# Patient Record
Sex: MALE | Race: WHITE | HISPANIC OR LATINO | Employment: FULL TIME | URBAN - METROPOLITAN AREA
[De-identification: names, ages, dates, MRNs, and addresses within clinical notes are randomized per-mention and may not be internally consistent; named-entity substitution may affect disease eponyms.]

---

## 2020-05-22 ENCOUNTER — HOSPITAL ENCOUNTER (INPATIENT)
Facility: MEDICAL CENTER | Age: 45
LOS: 1 days | DRG: 247 | End: 2020-05-24
Attending: EMERGENCY MEDICINE | Admitting: HOSPITALIST
Payer: COMMERCIAL

## 2020-05-22 ENCOUNTER — APPOINTMENT (OUTPATIENT)
Dept: RADIOLOGY | Facility: MEDICAL CENTER | Age: 45
DRG: 247 | End: 2020-05-22
Attending: EMERGENCY MEDICINE
Payer: COMMERCIAL

## 2020-05-22 DIAGNOSIS — I21.4 NSTEMI (NON-ST ELEVATED MYOCARDIAL INFARCTION) (HCC): ICD-10-CM

## 2020-05-22 PROBLEM — R07.9 PAIN IN THE CHEST: Status: ACTIVE | Noted: 2020-05-22

## 2020-05-22 PROBLEM — I16.0 HYPERTENSIVE URGENCY: Status: ACTIVE | Noted: 2020-05-22

## 2020-05-22 PROBLEM — I10 MALIGNANT HYPERTENSION: Status: ACTIVE | Noted: 2020-05-22

## 2020-05-22 LAB
ALBUMIN SERPL BCP-MCNC: 4.8 G/DL (ref 3.2–4.9)
ALBUMIN/GLOB SERPL: 1.5 G/DL
ALP SERPL-CCNC: 108 U/L (ref 30–99)
ALT SERPL-CCNC: 35 U/L (ref 2–50)
ANION GAP SERPL CALC-SCNC: 14 MMOL/L (ref 7–16)
APTT PPP: 25.1 SEC (ref 24.7–36)
AST SERPL-CCNC: 23 U/L (ref 12–45)
BASOPHILS # BLD AUTO: 0.4 % (ref 0–1.8)
BASOPHILS # BLD: 0.04 K/UL (ref 0–0.12)
BILIRUB SERPL-MCNC: 0.5 MG/DL (ref 0.1–1.5)
BUN SERPL-MCNC: 23 MG/DL (ref 8–22)
CALCIUM SERPL-MCNC: 10.1 MG/DL (ref 8.5–10.5)
CHLORIDE SERPL-SCNC: 104 MMOL/L (ref 96–112)
CO2 SERPL-SCNC: 23 MMOL/L (ref 20–33)
CREAT SERPL-MCNC: 1.24 MG/DL (ref 0.5–1.4)
EKG IMPRESSION: NORMAL
EKG IMPRESSION: NORMAL
EOSINOPHIL # BLD AUTO: 0.23 K/UL (ref 0–0.51)
EOSINOPHIL NFR BLD: 2.1 % (ref 0–6.9)
ERYTHROCYTE [DISTWIDTH] IN BLOOD BY AUTOMATED COUNT: 41.3 FL (ref 35.9–50)
GLOBULIN SER CALC-MCNC: 3.3 G/DL (ref 1.9–3.5)
GLUCOSE SERPL-MCNC: 101 MG/DL (ref 65–99)
HCT VFR BLD AUTO: 46.1 % (ref 42–52)
HGB BLD-MCNC: 15.8 G/DL (ref 14–18)
IMM GRANULOCYTES # BLD AUTO: 0.03 K/UL (ref 0–0.11)
IMM GRANULOCYTES NFR BLD AUTO: 0.3 % (ref 0–0.9)
INR PPP: 0.91 (ref 0.87–1.13)
LIPASE SERPL-CCNC: 24 U/L (ref 11–82)
LYMPHOCYTES # BLD AUTO: 3.49 K/UL (ref 1–4.8)
LYMPHOCYTES NFR BLD: 31.8 % (ref 22–41)
MCH RBC QN AUTO: 31.1 PG (ref 27–33)
MCHC RBC AUTO-ENTMCNC: 34.3 G/DL (ref 33.7–35.3)
MCV RBC AUTO: 90.7 FL (ref 81.4–97.8)
MONOCYTES # BLD AUTO: 0.96 K/UL (ref 0–0.85)
MONOCYTES NFR BLD AUTO: 8.8 % (ref 0–13.4)
NEUTROPHILS # BLD AUTO: 6.22 K/UL (ref 1.82–7.42)
NEUTROPHILS NFR BLD: 56.6 % (ref 44–72)
NRBC # BLD AUTO: 0 K/UL
NRBC BLD-RTO: 0 /100 WBC
NT-PROBNP SERPL IA-MCNC: 471 PG/ML (ref 0–125)
PLATELET # BLD AUTO: 284 K/UL (ref 164–446)
PMV BLD AUTO: 9.3 FL (ref 9–12.9)
POTASSIUM SERPL-SCNC: 4 MMOL/L (ref 3.6–5.5)
PROT SERPL-MCNC: 8.1 G/DL (ref 6–8.2)
PROTHROMBIN TIME: 12.5 SEC (ref 12–14.6)
RBC # BLD AUTO: 5.08 M/UL (ref 4.7–6.1)
SODIUM SERPL-SCNC: 141 MMOL/L (ref 135–145)
TROPONIN T SERPL-MCNC: 47 NG/L (ref 6–19)
WBC # BLD AUTO: 11 K/UL (ref 4.8–10.8)

## 2020-05-22 PROCEDURE — 36415 COLL VENOUS BLD VENIPUNCTURE: CPT

## 2020-05-22 PROCEDURE — 85610 PROTHROMBIN TIME: CPT

## 2020-05-22 PROCEDURE — G0378 HOSPITAL OBSERVATION PER HR: HCPCS

## 2020-05-22 PROCEDURE — 96365 THER/PROPH/DIAG IV INF INIT: CPT

## 2020-05-22 PROCEDURE — 99220 PR INITIAL OBSERVATION CARE,LEVL III: CPT | Performed by: HOSPITALIST

## 2020-05-22 PROCEDURE — 84484 ASSAY OF TROPONIN QUANT: CPT

## 2020-05-22 PROCEDURE — 71045 X-RAY EXAM CHEST 1 VIEW: CPT

## 2020-05-22 PROCEDURE — 85025 COMPLETE CBC W/AUTO DIFF WBC: CPT

## 2020-05-22 PROCEDURE — 93005 ELECTROCARDIOGRAM TRACING: CPT | Performed by: EMERGENCY MEDICINE

## 2020-05-22 PROCEDURE — 96366 THER/PROPH/DIAG IV INF ADDON: CPT

## 2020-05-22 PROCEDURE — 80053 COMPREHEN METABOLIC PANEL: CPT

## 2020-05-22 PROCEDURE — 99285 EMERGENCY DEPT VISIT HI MDM: CPT

## 2020-05-22 PROCEDURE — 83690 ASSAY OF LIPASE: CPT

## 2020-05-22 PROCEDURE — 700111 HCHG RX REV CODE 636 W/ 250 OVERRIDE (IP): Performed by: EMERGENCY MEDICINE

## 2020-05-22 PROCEDURE — 96375 TX/PRO/DX INJ NEW DRUG ADDON: CPT

## 2020-05-22 PROCEDURE — A9270 NON-COVERED ITEM OR SERVICE: HCPCS | Performed by: EMERGENCY MEDICINE

## 2020-05-22 PROCEDURE — 83880 ASSAY OF NATRIURETIC PEPTIDE: CPT

## 2020-05-22 PROCEDURE — 700111 HCHG RX REV CODE 636 W/ 250 OVERRIDE (IP): Performed by: HOSPITALIST

## 2020-05-22 PROCEDURE — 700102 HCHG RX REV CODE 250 W/ 637 OVERRIDE(OP): Performed by: EMERGENCY MEDICINE

## 2020-05-22 PROCEDURE — 93005 ELECTROCARDIOGRAM TRACING: CPT | Performed by: HOSPITALIST

## 2020-05-22 PROCEDURE — 85730 THROMBOPLASTIN TIME PARTIAL: CPT

## 2020-05-22 RX ORDER — NITROGLYCERIN 0.4 MG/1
0.4 TABLET SUBLINGUAL
Status: DISCONTINUED | OUTPATIENT
Start: 2020-05-22 | End: 2020-05-22

## 2020-05-22 RX ORDER — HEPARIN SODIUM 5000 [USP'U]/100ML
INJECTION, SOLUTION INTRAVENOUS CONTINUOUS
Status: DISCONTINUED | OUTPATIENT
Start: 2020-05-22 | End: 2020-05-23

## 2020-05-22 RX ORDER — HEPARIN SODIUM 1000 [USP'U]/ML
6000 INJECTION, SOLUTION INTRAVENOUS; SUBCUTANEOUS ONCE
Status: COMPLETED | OUTPATIENT
Start: 2020-05-22 | End: 2020-05-22

## 2020-05-22 RX ORDER — ONDANSETRON 2 MG/ML
4 INJECTION INTRAMUSCULAR; INTRAVENOUS ONCE
Status: COMPLETED | OUTPATIENT
Start: 2020-05-22 | End: 2020-05-22

## 2020-05-22 RX ORDER — ASPIRIN 325 MG
325 TABLET ORAL ONCE
Status: COMPLETED | OUTPATIENT
Start: 2020-05-22 | End: 2020-05-22

## 2020-05-22 RX ORDER — LISINOPRIL 10 MG/1
TABLET ORAL
COMMUNITY
End: 2020-06-03

## 2020-05-22 RX ORDER — POLYETHYLENE GLYCOL 3350 17 G/17G
1 POWDER, FOR SOLUTION ORAL
Status: DISCONTINUED | OUTPATIENT
Start: 2020-05-22 | End: 2020-05-24 | Stop reason: HOSPADM

## 2020-05-22 RX ORDER — LISINOPRIL 10 MG/1
10 TABLET ORAL
Status: DISCONTINUED | OUTPATIENT
Start: 2020-05-23 | End: 2020-05-24 | Stop reason: HOSPADM

## 2020-05-22 RX ORDER — HEPARIN SODIUM 1000 [USP'U]/ML
3200 INJECTION, SOLUTION INTRAVENOUS; SUBCUTANEOUS PRN
Status: DISCONTINUED | OUTPATIENT
Start: 2020-05-22 | End: 2020-05-23

## 2020-05-22 RX ORDER — AMLODIPINE BESYLATE 10 MG/1
10 TABLET ORAL DAILY
COMMUNITY
End: 2020-06-03

## 2020-05-22 RX ORDER — ATORVASTATIN CALCIUM 20 MG/1
40 TABLET, FILM COATED ORAL DAILY
COMMUNITY
End: 2020-06-03

## 2020-05-22 RX ORDER — HYDRALAZINE HYDROCHLORIDE 20 MG/ML
10 INJECTION INTRAMUSCULAR; INTRAVENOUS EVERY 6 HOURS PRN
Status: DISCONTINUED | OUTPATIENT
Start: 2020-05-22 | End: 2020-05-24 | Stop reason: HOSPADM

## 2020-05-22 RX ORDER — MORPHINE SULFATE 4 MG/ML
2-4 INJECTION, SOLUTION INTRAMUSCULAR; INTRAVENOUS
Status: DISCONTINUED | OUTPATIENT
Start: 2020-05-22 | End: 2020-05-24 | Stop reason: HOSPADM

## 2020-05-22 RX ORDER — ATORVASTATIN CALCIUM 40 MG/1
40 TABLET, FILM COATED ORAL EVERY EVENING
Status: DISCONTINUED | OUTPATIENT
Start: 2020-05-22 | End: 2020-05-23

## 2020-05-22 RX ORDER — CARVEDILOL 12.5 MG/1
12.5 TABLET ORAL 2 TIMES DAILY WITH MEALS
Status: DISCONTINUED | OUTPATIENT
Start: 2020-05-23 | End: 2020-05-24

## 2020-05-22 RX ORDER — NITROGLYCERIN 0.4 MG/1
0.4 TABLET SUBLINGUAL
Status: DISCONTINUED | OUTPATIENT
Start: 2020-05-22 | End: 2020-05-24 | Stop reason: HOSPADM

## 2020-05-22 RX ORDER — AMLODIPINE BESYLATE 5 MG/1
10 TABLET ORAL
Status: DISCONTINUED | OUTPATIENT
Start: 2020-05-23 | End: 2020-05-23

## 2020-05-22 RX ORDER — BISACODYL 10 MG
10 SUPPOSITORY, RECTAL RECTAL
Status: DISCONTINUED | OUTPATIENT
Start: 2020-05-22 | End: 2020-05-24 | Stop reason: HOSPADM

## 2020-05-22 RX ORDER — CARVEDILOL 12.5 MG/1
12.5 TABLET ORAL 2 TIMES DAILY WITH MEALS
COMMUNITY
End: 2020-06-03

## 2020-05-22 RX ORDER — ASPIRIN 81 MG/1
81 TABLET, CHEWABLE ORAL DAILY
COMMUNITY
End: 2024-01-17

## 2020-05-22 RX ORDER — AMOXICILLIN 250 MG
2 CAPSULE ORAL 2 TIMES DAILY
Status: DISCONTINUED | OUTPATIENT
Start: 2020-05-22 | End: 2020-05-24 | Stop reason: HOSPADM

## 2020-05-22 RX ADMIN — HYDRALAZINE HYDROCHLORIDE 10 MG: 20 INJECTION INTRAMUSCULAR; INTRAVENOUS at 22:10

## 2020-05-22 RX ADMIN — ASPIRIN 325 MG: 325 TABLET, FILM COATED ORAL at 20:02

## 2020-05-22 RX ADMIN — FENTANYL CITRATE 50 MCG: 50 INJECTION INTRAMUSCULAR; INTRAVENOUS at 20:02

## 2020-05-22 RX ADMIN — HEPARIN SODIUM 1200 UNITS/HR: 5000 INJECTION, SOLUTION INTRAVENOUS at 20:24

## 2020-05-22 RX ADMIN — HEPARIN SODIUM 6000 UNITS: 1000 INJECTION, SOLUTION INTRAVENOUS; SUBCUTANEOUS at 20:24

## 2020-05-22 RX ADMIN — ONDANSETRON 4 MG: 2 INJECTION INTRAMUSCULAR; INTRAVENOUS at 20:02

## 2020-05-22 SDOH — HEALTH STABILITY: MENTAL HEALTH: HOW OFTEN DO YOU HAVE 6 OR MORE DRINKS ON ONE OCCASION?: LESS THAN MONTHLY

## 2020-05-22 SDOH — HEALTH STABILITY: MENTAL HEALTH: HOW MANY STANDARD DRINKS CONTAINING ALCOHOL DO YOU HAVE ON A TYPICAL DAY?: 5 OR 6

## 2020-05-22 SDOH — ECONOMIC STABILITY: TRANSPORTATION INSECURITY
IN THE PAST 12 MONTHS, HAS LACK OF TRANSPORTATION KEPT YOU FROM MEETINGS, WORK, OR FROM GETTING THINGS NEEDED FOR DAILY LIVING?: PATIENT DECLINED

## 2020-05-22 SDOH — ECONOMIC STABILITY: FOOD INSECURITY: WITHIN THE PAST 12 MONTHS, THE FOOD YOU BOUGHT JUST DIDN'T LAST AND YOU DIDN'T HAVE MONEY TO GET MORE.: PATIENT DECLINED

## 2020-05-22 SDOH — HEALTH STABILITY: MENTAL HEALTH: HOW OFTEN DO YOU HAVE A DRINK CONTAINING ALCOHOL?: 2-4 TIMES A MONTH

## 2020-05-22 SDOH — ECONOMIC STABILITY: TRANSPORTATION INSECURITY
IN THE PAST 12 MONTHS, HAS THE LACK OF TRANSPORTATION KEPT YOU FROM MEDICAL APPOINTMENTS OR FROM GETTING MEDICATIONS?: PATIENT DECLINED

## 2020-05-22 SDOH — ECONOMIC STABILITY: FOOD INSECURITY: WITHIN THE PAST 12 MONTHS, YOU WORRIED THAT YOUR FOOD WOULD RUN OUT BEFORE YOU GOT MONEY TO BUY MORE.: PATIENT DECLINED

## 2020-05-22 ASSESSMENT — ENCOUNTER SYMPTOMS
TINGLING: 0
SORE THROAT: 0
WHEEZING: 0
HEADACHES: 0
VOMITING: 0
DIARRHEA: 0
ABDOMINAL PAIN: 0
DIZZINESS: 0
CHILLS: 0
PHOTOPHOBIA: 0
FOCAL WEAKNESS: 0
DEPRESSION: 0
PALPITATIONS: 0
SHORTNESS OF BREATH: 1
MYALGIAS: 0
NAUSEA: 0
COUGH: 0
FEVER: 0

## 2020-05-22 ASSESSMENT — FIBROSIS 4 INDEX
FIB4 SCORE: 0.63

## 2020-05-22 ASSESSMENT — COGNITIVE AND FUNCTIONAL STATUS - GENERAL
DAILY ACTIVITIY SCORE: 24
MOBILITY SCORE: 24
SUGGESTED CMS G CODE MODIFIER MOBILITY: CH
SUGGESTED CMS G CODE MODIFIER DAILY ACTIVITY: CH

## 2020-05-22 ASSESSMENT — COPD QUESTIONNAIRES
DURING THE PAST 4 WEEKS HOW MUCH DID YOU FEEL SHORT OF BREATH: NONE/LITTLE OF THE TIME
COPD SCREENING SCORE: 2
IN THE PAST 12 MONTHS DO YOU DO LESS THAN YOU USED TO BECAUSE OF YOUR BREATHING PROBLEMS: DISAGREE/UNSURE
HAVE YOU SMOKED AT LEAST 100 CIGARETTES IN YOUR ENTIRE LIFE: YES
DO YOU EVER COUGH UP ANY MUCUS OR PHLEGM?: NO/ONLY WITH OCCASIONAL COLDS OR INFECTIONS

## 2020-05-22 ASSESSMENT — LIFESTYLE VARIABLES
HOW MANY TIMES IN THE PAST YEAR HAVE YOU HAD 5 OR MORE DRINKS IN A DAY: 0
EVER FELT BAD OR GUILTY ABOUT YOUR DRINKING: NO
AVERAGE NUMBER OF DAYS PER WEEK YOU HAVE A DRINK CONTAINING ALCOHOL: 1
HAVE YOU EVER FELT YOU SHOULD CUT DOWN ON YOUR DRINKING: NO
TOTAL SCORE: 0
ALCOHOL_USE: YES
ON A TYPICAL DAY WHEN YOU DRINK ALCOHOL HOW MANY DRINKS DO YOU HAVE: 5
CONSUMPTION TOTAL: NEGATIVE
DOES PATIENT WANT TO STOP DRINKING: NO
HAVE PEOPLE ANNOYED YOU BY CRITICIZING YOUR DRINKING: NO
TOTAL SCORE: 0
TOTAL SCORE: 0
EVER_SMOKED: YES
EVER HAD A DRINK FIRST THING IN THE MORNING TO STEADY YOUR NERVES TO GET RID OF A HANGOVER: NO

## 2020-05-22 ASSESSMENT — PATIENT HEALTH QUESTIONNAIRE - PHQ9
2. FEELING DOWN, DEPRESSED, IRRITABLE, OR HOPELESS: NOT AT ALL
1. LITTLE INTEREST OR PLEASURE IN DOING THINGS: NOT AT ALL
SUM OF ALL RESPONSES TO PHQ9 QUESTIONS 1 AND 2: 0

## 2020-05-22 ASSESSMENT — PAIN DESCRIPTION - DESCRIPTORS: DESCRIPTORS: PRESSURE

## 2020-05-23 ENCOUNTER — APPOINTMENT (OUTPATIENT)
Dept: CARDIOLOGY | Facility: MEDICAL CENTER | Age: 45
DRG: 247 | End: 2020-05-23
Attending: NURSE PRACTITIONER
Payer: COMMERCIAL

## 2020-05-23 PROBLEM — Z95.5 STATUS POST INSERTION OF DRUG-ELUTING STENT INTO LEFT ANTERIOR DESCENDING (LAD) ARTERY: Chronic | Status: ACTIVE | Noted: 2020-05-23

## 2020-05-23 PROBLEM — I21.4 NSTEMI (NON-ST ELEVATED MYOCARDIAL INFARCTION) (HCC): Status: ACTIVE | Noted: 2020-05-22

## 2020-05-23 LAB
ALBUMIN SERPL BCP-MCNC: 4.2 G/DL (ref 3.2–4.9)
ALBUMIN/GLOB SERPL: 1.4 G/DL
ALP SERPL-CCNC: 102 U/L (ref 30–99)
ALT SERPL-CCNC: 40 U/L (ref 2–50)
ANION GAP SERPL CALC-SCNC: 13 MMOL/L (ref 7–16)
ANION GAP SERPL CALC-SCNC: 15 MMOL/L (ref 7–16)
APTT PPP: 59.7 SEC (ref 24.7–36)
APTT PPP: 64.1 SEC (ref 24.7–36)
APTT PPP: 66.8 SEC (ref 24.7–36)
APTT PPP: 67.6 SEC (ref 24.7–36)
AST SERPL-CCNC: 184 U/L (ref 12–45)
BILIRUB SERPL-MCNC: 0.7 MG/DL (ref 0.1–1.5)
BUN SERPL-MCNC: 15 MG/DL (ref 8–22)
BUN SERPL-MCNC: 22 MG/DL (ref 8–22)
CALCIUM SERPL-MCNC: 9.4 MG/DL (ref 8.5–10.5)
CALCIUM SERPL-MCNC: 9.6 MG/DL (ref 8.5–10.5)
CHLORIDE SERPL-SCNC: 104 MMOL/L (ref 96–112)
CHLORIDE SERPL-SCNC: 104 MMOL/L (ref 96–112)
CHOLEST SERPL-MCNC: 134 MG/DL (ref 100–199)
CO2 SERPL-SCNC: 21 MMOL/L (ref 20–33)
CO2 SERPL-SCNC: 21 MMOL/L (ref 20–33)
CREAT SERPL-MCNC: 1.13 MG/DL (ref 0.5–1.4)
CREAT SERPL-MCNC: 1.15 MG/DL (ref 0.5–1.4)
EKG IMPRESSION: NORMAL
ERYTHROCYTE [DISTWIDTH] IN BLOOD BY AUTOMATED COUNT: 43.8 FL (ref 35.9–50)
ERYTHROCYTE [DISTWIDTH] IN BLOOD BY AUTOMATED COUNT: 45.7 FL (ref 35.9–50)
GLOBULIN SER CALC-MCNC: 3 G/DL (ref 1.9–3.5)
GLUCOSE SERPL-MCNC: 102 MG/DL (ref 65–99)
GLUCOSE SERPL-MCNC: 91 MG/DL (ref 65–99)
HCT VFR BLD AUTO: 46.8 % (ref 42–52)
HCT VFR BLD AUTO: 48 % (ref 42–52)
HDLC SERPL-MCNC: 46 MG/DL
HGB BLD-MCNC: 15.7 G/DL (ref 14–18)
HGB BLD-MCNC: 15.8 G/DL (ref 14–18)
INR PPP: 1.51 (ref 0.87–1.13)
LDLC SERPL CALC-MCNC: 73 MG/DL
LV EJECT FRACT  99904: 40
LV EJECT FRACT MOD 2C 99903: 52.41
LV EJECT FRACT MOD 4C 99902: 50.83
LV EJECT FRACT MOD BP 99901: 52.15
MAGNESIUM SERPL-MCNC: 2.1 MG/DL (ref 1.5–2.5)
MCH RBC QN AUTO: 31.4 PG (ref 27–33)
MCH RBC QN AUTO: 31.5 PG (ref 27–33)
MCHC RBC AUTO-ENTMCNC: 32.7 G/DL (ref 33.7–35.3)
MCHC RBC AUTO-ENTMCNC: 33.8 G/DL (ref 33.7–35.3)
MCV RBC AUTO: 93 FL (ref 81.4–97.8)
MCV RBC AUTO: 96.4 FL (ref 81.4–97.8)
PLATELET # BLD AUTO: 219 K/UL (ref 164–446)
PLATELET # BLD AUTO: 262 K/UL (ref 164–446)
PMV BLD AUTO: 9.6 FL (ref 9–12.9)
PMV BLD AUTO: 9.6 FL (ref 9–12.9)
POTASSIUM SERPL-SCNC: 4.1 MMOL/L (ref 3.6–5.5)
POTASSIUM SERPL-SCNC: 4.4 MMOL/L (ref 3.6–5.5)
PROT SERPL-MCNC: 7.2 G/DL (ref 6–8.2)
PROTHROMBIN TIME: 18.6 SEC (ref 12–14.6)
RBC # BLD AUTO: 4.98 M/UL (ref 4.7–6.1)
RBC # BLD AUTO: 5.03 M/UL (ref 4.7–6.1)
SODIUM SERPL-SCNC: 138 MMOL/L (ref 135–145)
SODIUM SERPL-SCNC: 140 MMOL/L (ref 135–145)
TRIGL SERPL-MCNC: 75 MG/DL (ref 0–149)
TROPONIN T SERPL-MCNC: 1547 NG/L (ref 6–19)
TROPONIN T SERPL-MCNC: 197 NG/L (ref 6–19)
TROPONIN T SERPL-MCNC: 563 NG/L (ref 6–19)
WBC # BLD AUTO: 11.6 K/UL (ref 4.8–10.8)
WBC # BLD AUTO: 9.7 K/UL (ref 4.8–10.8)

## 2020-05-23 PROCEDURE — 85610 PROTHROMBIN TIME: CPT

## 2020-05-23 PROCEDURE — 700102 HCHG RX REV CODE 250 W/ 637 OVERRIDE(OP): Performed by: INTERNAL MEDICINE

## 2020-05-23 PROCEDURE — B2151ZZ FLUOROSCOPY OF LEFT HEART USING LOW OSMOLAR CONTRAST: ICD-10-PCS | Performed by: INTERNAL MEDICINE

## 2020-05-23 PROCEDURE — 4A023N7 MEASUREMENT OF CARDIAC SAMPLING AND PRESSURE, LEFT HEART, PERCUTANEOUS APPROACH: ICD-10-PCS | Performed by: INTERNAL MEDICINE

## 2020-05-23 PROCEDURE — 93010 ELECTROCARDIOGRAM REPORT: CPT | Performed by: INTERNAL MEDICINE

## 2020-05-23 PROCEDURE — 93458 L HRT ARTERY/VENTRICLE ANGIO: CPT | Mod: 26,59 | Performed by: INTERNAL MEDICINE

## 2020-05-23 PROCEDURE — 80048 BASIC METABOLIC PNL TOTAL CA: CPT

## 2020-05-23 PROCEDURE — 80053 COMPREHEN METABOLIC PANEL: CPT

## 2020-05-23 PROCEDURE — 80061 LIPID PANEL: CPT

## 2020-05-23 PROCEDURE — A9270 NON-COVERED ITEM OR SERVICE: HCPCS | Performed by: HOSPITALIST

## 2020-05-23 PROCEDURE — 84484 ASSAY OF TROPONIN QUANT: CPT | Mod: 91

## 2020-05-23 PROCEDURE — 93010 ELECTROCARDIOGRAM REPORT: CPT | Mod: 77 | Performed by: INTERNAL MEDICINE

## 2020-05-23 PROCEDURE — 92928 PRQ TCAT PLMT NTRAC ST 1 LES: CPT | Mod: 59,LC | Performed by: INTERNAL MEDICINE

## 2020-05-23 PROCEDURE — 700102 HCHG RX REV CODE 250 W/ 637 OVERRIDE(OP): Performed by: NURSE PRACTITIONER

## 2020-05-23 PROCEDURE — C1887 CATHETER, GUIDING: HCPCS

## 2020-05-23 PROCEDURE — 93010 ELECTROCARDIOGRAM REPORT: CPT | Mod: 76 | Performed by: INTERNAL MEDICINE

## 2020-05-23 PROCEDURE — A9270 NON-COVERED ITEM OR SERVICE: HCPCS

## 2020-05-23 PROCEDURE — B2111ZZ FLUOROSCOPY OF MULTIPLE CORONARY ARTERIES USING LOW OSMOLAR CONTRAST: ICD-10-PCS | Performed by: INTERNAL MEDICINE

## 2020-05-23 PROCEDURE — 770020 HCHG ROOM/CARE - TELE (206)

## 2020-05-23 PROCEDURE — 700101 HCHG RX REV CODE 250

## 2020-05-23 PROCEDURE — 700102 HCHG RX REV CODE 250 W/ 637 OVERRIDE(OP): Performed by: HOSPITALIST

## 2020-05-23 PROCEDURE — 83735 ASSAY OF MAGNESIUM: CPT

## 2020-05-23 PROCEDURE — 36415 COLL VENOUS BLD VENIPUNCTURE: CPT

## 2020-05-23 PROCEDURE — 93005 ELECTROCARDIOGRAM TRACING: CPT | Performed by: HOSPITALIST

## 2020-05-23 PROCEDURE — 96366 THER/PROPH/DIAG IV INF ADDON: CPT

## 2020-05-23 PROCEDURE — 700105 HCHG RX REV CODE 258: Performed by: INTERNAL MEDICINE

## 2020-05-23 PROCEDURE — 93005 ELECTROCARDIOGRAM TRACING: CPT | Performed by: INTERNAL MEDICINE

## 2020-05-23 PROCEDURE — 85027 COMPLETE CBC AUTOMATED: CPT

## 2020-05-23 PROCEDURE — 99205 OFFICE O/P NEW HI 60 MIN: CPT | Mod: 25 | Performed by: INTERNAL MEDICINE

## 2020-05-23 PROCEDURE — A9270 NON-COVERED ITEM OR SERVICE: HCPCS | Performed by: NURSE PRACTITIONER

## 2020-05-23 PROCEDURE — 027136Z DILATION OF CORONARY ARTERY, TWO ARTERIES WITH THREE DRUG-ELUTING INTRALUMINAL DEVICES, PERCUTANEOUS APPROACH: ICD-10-PCS | Performed by: INTERNAL MEDICINE

## 2020-05-23 PROCEDURE — 93306 TTE W/DOPPLER COMPLETE: CPT

## 2020-05-23 PROCEDURE — 700111 HCHG RX REV CODE 636 W/ 250 OVERRIDE (IP): Performed by: INTERNAL MEDICINE

## 2020-05-23 PROCEDURE — 700111 HCHG RX REV CODE 636 W/ 250 OVERRIDE (IP)

## 2020-05-23 PROCEDURE — 93306 TTE W/DOPPLER COMPLETE: CPT | Mod: 26 | Performed by: INTERNAL MEDICINE

## 2020-05-23 PROCEDURE — 94660 CPAP INITIATION&MGMT: CPT

## 2020-05-23 PROCEDURE — 85730 THROMBOPLASTIN TIME PARTIAL: CPT | Mod: 91

## 2020-05-23 PROCEDURE — 99152 MOD SED SAME PHYS/QHP 5/>YRS: CPT | Performed by: INTERNAL MEDICINE

## 2020-05-23 PROCEDURE — 700111 HCHG RX REV CODE 636 W/ 250 OVERRIDE (IP): Performed by: HOSPITALIST

## 2020-05-23 PROCEDURE — 93005 ELECTROCARDIOGRAM TRACING: CPT | Performed by: NURSE PRACTITIONER

## 2020-05-23 PROCEDURE — 96375 TX/PRO/DX INJ NEW DRUG ADDON: CPT

## 2020-05-23 PROCEDURE — 99232 SBSQ HOSP IP/OBS MODERATE 35: CPT | Performed by: HOSPITALIST

## 2020-05-23 PROCEDURE — 700117 HCHG RX CONTRAST REV CODE 255: Performed by: INTERNAL MEDICINE

## 2020-05-23 PROCEDURE — A9270 NON-COVERED ITEM OR SERVICE: HCPCS | Performed by: INTERNAL MEDICINE

## 2020-05-23 PROCEDURE — 700102 HCHG RX REV CODE 250 W/ 637 OVERRIDE(OP)

## 2020-05-23 PROCEDURE — 02703ZZ DILATION OF CORONARY ARTERY, ONE ARTERY, PERCUTANEOUS APPROACH: ICD-10-PCS | Performed by: INTERNAL MEDICINE

## 2020-05-23 RX ORDER — SODIUM CHLORIDE 9 MG/ML
INJECTION, SOLUTION INTRAVENOUS CONTINUOUS
Status: DISCONTINUED | OUTPATIENT
Start: 2020-05-23 | End: 2020-05-23

## 2020-05-23 RX ORDER — ATORVASTATIN CALCIUM 80 MG/1
80 TABLET, FILM COATED ORAL EVERY EVENING
Status: DISCONTINUED | OUTPATIENT
Start: 2020-05-23 | End: 2020-05-24 | Stop reason: HOSPADM

## 2020-05-23 RX ORDER — CALCIUM CARBONATE 500 MG/1
500 TABLET, CHEWABLE ORAL 3 TIMES DAILY PRN
Status: DISCONTINUED | OUTPATIENT
Start: 2020-05-23 | End: 2020-05-23

## 2020-05-23 RX ORDER — CLOPIDOGREL BISULFATE 75 MG/1
75 TABLET ORAL DAILY
Status: DISCONTINUED | OUTPATIENT
Start: 2020-05-25 | End: 2020-05-24 | Stop reason: HOSPADM

## 2020-05-23 RX ORDER — CLOPIDOGREL 300 MG/1
600 TABLET, FILM COATED ORAL ONCE
Status: COMPLETED | OUTPATIENT
Start: 2020-05-24 | End: 2020-05-24

## 2020-05-23 RX ORDER — CLOPIDOGREL BISULFATE 75 MG/1
75 TABLET ORAL DAILY
Qty: 30 TAB | Refills: 11 | Status: SHIPPED | OUTPATIENT
Start: 2020-05-25 | End: 2020-06-03 | Stop reason: SDUPTHER

## 2020-05-23 RX ORDER — CALCIUM CARBONATE 500 MG/1
500 TABLET, CHEWABLE ORAL 3 TIMES DAILY PRN
Status: DISCONTINUED | OUTPATIENT
Start: 2020-05-23 | End: 2020-05-24 | Stop reason: HOSPADM

## 2020-05-23 RX ORDER — BIVALIRUDIN 250 MG/5ML
INJECTION, POWDER, LYOPHILIZED, FOR SOLUTION INTRAVENOUS
Status: COMPLETED
Start: 2020-05-23 | End: 2020-05-23

## 2020-05-23 RX ORDER — MIDAZOLAM HYDROCHLORIDE 1 MG/ML
INJECTION INTRAMUSCULAR; INTRAVENOUS
Status: COMPLETED
Start: 2020-05-23 | End: 2020-05-23

## 2020-05-23 RX ORDER — VERAPAMIL HYDROCHLORIDE 2.5 MG/ML
INJECTION, SOLUTION INTRAVENOUS
Status: COMPLETED
Start: 2020-05-23 | End: 2020-05-23

## 2020-05-23 RX ORDER — HEPARIN SODIUM 200 [USP'U]/100ML
INJECTION, SOLUTION INTRAVENOUS
Status: COMPLETED
Start: 2020-05-23 | End: 2020-05-23

## 2020-05-23 RX ORDER — FUROSEMIDE 10 MG/ML
20 INJECTION INTRAMUSCULAR; INTRAVENOUS ONCE
Status: COMPLETED | OUTPATIENT
Start: 2020-05-23 | End: 2020-05-23

## 2020-05-23 RX ORDER — LIDOCAINE HYDROCHLORIDE 20 MG/ML
INJECTION, SOLUTION INFILTRATION; PERINEURAL
Status: COMPLETED
Start: 2020-05-23 | End: 2020-05-23

## 2020-05-23 RX ORDER — AMLODIPINE BESYLATE 5 MG/1
5 TABLET ORAL
Status: DISCONTINUED | OUTPATIENT
Start: 2020-05-24 | End: 2020-05-24 | Stop reason: HOSPADM

## 2020-05-23 RX ORDER — HEPARIN SODIUM,PORCINE 1000/ML
VIAL (ML) INJECTION
Status: COMPLETED
Start: 2020-05-23 | End: 2020-05-23

## 2020-05-23 RX ADMIN — NITROGLYCERIN 1 INCH: 20 OINTMENT TOPICAL at 16:57

## 2020-05-23 RX ADMIN — HEPARIN SODIUM: 1000 INJECTION, SOLUTION INTRAVENOUS; SUBCUTANEOUS at 09:59

## 2020-05-23 RX ADMIN — CARVEDILOL 12.5 MG: 12.5 TABLET, FILM COATED ORAL at 08:19

## 2020-05-23 RX ADMIN — ATORVASTATIN CALCIUM 80 MG: 80 TABLET, FILM COATED ORAL at 16:57

## 2020-05-23 RX ADMIN — MIDAZOLAM HYDROCHLORIDE 1 MG: 1 INJECTION, SOLUTION INTRAMUSCULAR; INTRAVENOUS at 10:46

## 2020-05-23 RX ADMIN — SODIUM CHLORIDE: 9 INJECTION, SOLUTION INTRAVENOUS at 12:48

## 2020-05-23 RX ADMIN — HEPARIN SODIUM 2000 UNITS: 200 INJECTION, SOLUTION INTRAVENOUS at 09:55

## 2020-05-23 RX ADMIN — MIDAZOLAM HYDROCHLORIDE 2 MG: 1 INJECTION, SOLUTION INTRAMUSCULAR; INTRAVENOUS at 10:01

## 2020-05-23 RX ADMIN — AMLODIPINE BESYLATE 10 MG: 5 TABLET ORAL at 05:16

## 2020-05-23 RX ADMIN — VERAPAMIL HYDROCHLORIDE 2.5 MG: 2.5 INJECTION, SOLUTION INTRAVENOUS at 09:30

## 2020-05-23 RX ADMIN — FENTANYL CITRATE 100 MCG: 50 INJECTION INTRAMUSCULAR; INTRAVENOUS at 10:18

## 2020-05-23 RX ADMIN — MORPHINE SULFATE 2 MG: 4 INJECTION INTRAVENOUS at 06:06

## 2020-05-23 RX ADMIN — NITROGLYCERIN 10 ML: 20 INJECTION INTRAVENOUS at 09:56

## 2020-05-23 RX ADMIN — ASPIRIN 81 MG: 81 TABLET, COATED ORAL at 05:16

## 2020-05-23 RX ADMIN — LIDOCAINE HYDROCHLORIDE: 20 INJECTION, SOLUTION INFILTRATION; PERINEURAL at 09:55

## 2020-05-23 RX ADMIN — TICAGRELOR 180 MG: 90 TABLET ORAL at 10:50

## 2020-05-23 RX ADMIN — SODIUM CHLORIDE: 9 INJECTION, SOLUTION INTRAVENOUS at 11:30

## 2020-05-23 RX ADMIN — CARVEDILOL 12.5 MG: 12.5 TABLET, FILM COATED ORAL at 16:57

## 2020-05-23 RX ADMIN — BIVALIRUDIN 250 MG: 250 INJECTION, POWDER, LYOPHILIZED, FOR SOLUTION INTRAVENOUS at 10:09

## 2020-05-23 RX ADMIN — DOCUSATE SODIUM 50 MG AND SENNOSIDES 8.6 MG 2 TABLET: 8.6; 5 TABLET, FILM COATED ORAL at 05:16

## 2020-05-23 RX ADMIN — BIVALIRUDIN 250 MG: 250 INJECTION, POWDER, LYOPHILIZED, FOR SOLUTION INTRAVENOUS at 10:44

## 2020-05-23 RX ADMIN — FUROSEMIDE 20 MG: 10 INJECTION, SOLUTION INTRAMUSCULAR; INTRAVENOUS at 15:47

## 2020-05-23 RX ADMIN — LISINOPRIL 10 MG: 10 TABLET ORAL at 05:16

## 2020-05-23 RX ADMIN — IOHEXOL 172 ML: 350 INJECTION, SOLUTION INTRAVENOUS at 09:55

## 2020-05-23 ASSESSMENT — ENCOUNTER SYMPTOMS
FEVER: 0
DOUBLE VISION: 0
WEIGHT LOSS: 0
SPEECH CHANGE: 0
NAUSEA: 0
ABDOMINAL PAIN: 0
TREMORS: 0
COUGH: 0
PALPITATIONS: 0
VOMITING: 0
SPUTUM PRODUCTION: 0
CHILLS: 0
HEARTBURN: 0
BLURRED VISION: 0
HEADACHES: 0
NECK PAIN: 0
SENSORY CHANGE: 0
TINGLING: 0
HEMOPTYSIS: 0
MYALGIAS: 0
ORTHOPNEA: 0
DIZZINESS: 0

## 2020-05-23 ASSESSMENT — FIBROSIS 4 INDEX: FIB4 SCORE: 6.11

## 2020-05-23 ASSESSMENT — LIFESTYLE VARIABLES: SUBSTANCE_ABUSE: 0

## 2020-05-23 NOTE — ASSESSMENT & PLAN NOTE
Resume home Norvasc, Coreg, and lisinopril with additional dose of lisinopril tonight.  Will add PRN IV antihypertensives and adjust home medications accordingly.

## 2020-05-23 NOTE — PROGRESS NOTES
RACHAEL Weeks called to tell me lab called her with pt troponin of 197. Informed DrHenrique Will continue to monitor and assess for CP/discomfort.  Pt educated on importance of calling immediately for any discomfort.

## 2020-05-23 NOTE — ED PROVIDER NOTES
ED Provider Note    CHIEF COMPLAINT  Chief Complaint   Patient presents with   • Chest Pressure     began 15 mins ago, radiates to L arm. Hx MI years ago, with 4 stents placed. + diaphoresis. denies SOB, N/V       HPI  Haider Crawford Jr. is a 44 y.o. male who presents Complaining of chest pain.  The chest pain is 8/10 in severity.  Severe.  He states he feels like someone sitting on his chest.  Is associate with shortness of breath and dyspnea.  Came on while he was driving roughly 1 hour ago.  Patient states he has had 3 episodes of chest pain over the earlier part of the week.  Those chest pain episodes were only lasting 10 to 15 minutes and much more mild.  The pain was relieved here with sublingual nitroglycerin and narcotics.  Patient has a history of coronary disease and had 4 stents placed 4 years ago.  Patient is unsure if this pain was similar to when he had a stents placed.  Patient does not have a local cardiologist.    REVIEW OF SYSTEMS  See HPI for further details.  No fever no chills.  No cough or cold symptoms.  Positive chest pain.  Positive dyspnea.  All other systems are negative.    PAST MEDICAL HISTORY  Past Medical History:   Diagnosis Date   • MI (myocardial infarction) ([facility]) 2018       FAMILY HISTORY  History reviewed. No pertinent family history.    SOCIAL HISTORY  Social History     Socioeconomic History   • Marital status:      Spouse name: Not on file   • Number of children: Not on file   • Years of education: Not on file   • Highest education level: Not on file   Occupational History   • Not on file   Social Needs   • Financial resource strain: Not on file   • Food insecurity     Worry: Not on file     Inability: Not on file   • Transportation needs     Medical: Not on file     Non-medical: Not on file   Tobacco Use   • Smoking status: Never Smoker   • Smokeless tobacco: Never Used   Substance and Sexual Activity   • Alcohol use: Yes     Frequency: 2-3 times a week   •  "Drug use: Not on file   • Sexual activity: Not on file   Lifestyle   • Physical activity     Days per week: Not on file     Minutes per session: Not on file   • Stress: Not on file   Relationships   • Social connections     Talks on phone: Not on file     Gets together: Not on file     Attends Buddhism service: Not on file     Active member of club or organization: Not on file     Attends meetings of clubs or organizations: Not on file     Relationship status: Not on file   • Intimate partner violence     Fear of current or ex partner: Not on file     Emotionally abused: Not on file     Physically abused: Not on file     Forced sexual activity: Not on file   Other Topics Concern   • Not on file   Social History Narrative   • Not on file       SURGICAL HISTORY  History reviewed. No pertinent surgical history.    CURRENT MEDICATIONS  Home Medications    **Home medications have not yet been reviewed for this encounter**         ALLERGIES  No Known Allergies    PHYSICAL EXAM  VITAL SIGNS: BP (!) 199/123   Pulse 77   Temp 36.6 °C (97.8 °F)   Resp 16   Ht 1.803 m (5' 11\")   Wt 99.8 kg (220 lb)   SpO2 98%   BMI 30.68 kg/m²   Constitutional: Patient appears uncomfortable.  Diaphoretic  HENT: Normocephalic, Atraumatic, Bilateral external ears normal, Oropharynx moist, No oral exudates, Nose normal.   Eyes: SAROJ, EOMI, Conjunctiva normal,   Neck: Normal range of motion, No tenderness, Supple,   Lymphatic: No lymphadenopathy noted.   Cardiovascular: Regular rate and rhythm without murmur rub or gallop  Thorax & Lungs: Clear without wheezing  Abdomen: Bowel sounds normal, Soft, No tenderness,   Skin: Warm, Dry, No erythema, No rash.   Back: No tenderness, No CVA tenderness.   Extremities: No edema, No tenderness, No cyanosis, No clubbing. Dorsalis pedis pulses 2+ equal bilaterally. Radial pulses 2+ equal bilaterally.  Negative f Homans sign  Neurologic: Alert & oriented x 3, Normal motor function, Normal sensory " function, No focal deficits noted.     EKG  #1:  Sinus rhythm at a rate of 70.  Normal P waves.  Normal QRS.  Normal R wave progression.  Normal ST segments.  Extensive baseline artifact concerning for atrial fibrillation or a flutter.    #2:  Sinus rhythm at a rate of 77.  Normal P waves.  Normal QRS.  There is diffuse T wave peaking V2 through V5.  Abnormal EKG concerning for possible ischemic T wave changes.    3: Sinus rhythm at a rate of 60.  Normal P waves.  LVH by voltage.  Borderline ST depression laterally V4 V5 V6.  Abnormal EKG showing prior anterior MI with LVH.    RADIOLOGY/PROCEDURES  DX-CHEST-PORTABLE (1 VIEW)   Final Result      1.  Mild cardiomegaly.   2.  No acute cardiopulmonary disease.        Results for orders placed or performed during the hospital encounter of 05/22/20   CBC WITH DIFFERENTIAL   Result Value Ref Range    WBC 11.0 (H) 4.8 - 10.8 K/uL    RBC 5.08 4.70 - 6.10 M/uL    Hemoglobin 15.8 14.0 - 18.0 g/dL    Hematocrit 46.1 42.0 - 52.0 %    MCV 90.7 81.4 - 97.8 fL    MCH 31.1 27.0 - 33.0 pg    MCHC 34.3 33.7 - 35.3 g/dL    RDW 41.3 35.9 - 50.0 fL    Platelet Count 284 164 - 446 K/uL    MPV 9.3 9.0 - 12.9 fL    Neutrophils-Polys 56.60 44.00 - 72.00 %    Lymphocytes 31.80 22.00 - 41.00 %    Monocytes 8.80 0.00 - 13.40 %    Eosinophils 2.10 0.00 - 6.90 %    Basophils 0.40 0.00 - 1.80 %    Immature Granulocytes 0.30 0.00 - 0.90 %    Nucleated RBC 0.00 /100 WBC    Neutrophils (Absolute) 6.22 1.82 - 7.42 K/uL    Lymphs (Absolute) 3.49 1.00 - 4.80 K/uL    Monos (Absolute) 0.96 (H) 0.00 - 0.85 K/uL    Eos (Absolute) 0.23 0.00 - 0.51 K/uL    Baso (Absolute) 0.04 0.00 - 0.12 K/uL    Immature Granulocytes (abs) 0.03 0.00 - 0.11 K/uL    NRBC (Absolute) 0.00 K/uL   TROPONIN   Result Value Ref Range    Troponin T 47 (H) 6 - 19 ng/L   COMP METABOLIC PANEL   Result Value Ref Range    Sodium 141 135 - 145 mmol/L    Potassium 4.0 3.6 - 5.5 mmol/L    Chloride 104 96 - 112 mmol/L    Co2 23 20 - 33 mmol/L     Anion Gap 14.0 7.0 - 16.0    Glucose 101 (H) 65 - 99 mg/dL    Bun 23 (H) 8 - 22 mg/dL    Creatinine 1.24 0.50 - 1.40 mg/dL    Calcium 10.1 8.5 - 10.5 mg/dL    AST(SGOT) 23 12 - 45 U/L    ALT(SGPT) 35 2 - 50 U/L    Alkaline Phosphatase 108 (H) 30 - 99 U/L    Total Bilirubin 0.5 0.1 - 1.5 mg/dL    Albumin 4.8 3.2 - 4.9 g/dL    Total Protein 8.1 6.0 - 8.2 g/dL    Globulin 3.3 1.9 - 3.5 g/dL    A-G Ratio 1.5 g/dL   LIPASE   Result Value Ref Range    Lipase 24 11 - 82 U/L   PROTHROMBIN TIME (INR)   Result Value Ref Range    PT 12.5 12.0 - 14.6 sec    INR 0.91 0.87 - 1.13   APTT   Result Value Ref Range    APTT 25.1 24.7 - 36.0 sec   ESTIMATED GFR   Result Value Ref Range    GFR If African American >60 >60 mL/min/1.73 m 2    GFR If Non African American >60 >60 mL/min/1.73 m 2   EKG (NOW)   Result Value Ref Range    Report       [facility] Emergency Dept.    Test Date:  2020  Pt Name:    STEVEN YIP            Department: ER  MRN:        [id number]                      Room:  Gender:     Male                         Technician: [id number]  :        1975                   Requested By:ER TRIAGE PROTOCOL  Order #:    [id number]                    Reading MD:    Measurements  Intervals                                Axis  Rate:       77                           P:  RI:                                      QRS:        10  QRSD:       116                          T:          -12  QT:         448  QTc:        508    Interpretive Statements  A-FLUTTER/FIBRILLATION W/ COMPLETE AV BLOCK  NONSPECIFIC INTRAVENTRICULAR CONDUCTION DELAY  MINIMAL ST DEPRESSION, INFERIOR LEADS  Compared to ECG 2020 19:37:34  AV block, complete (third-degree) now present  Intraventricular conduction delay now present  ST (T wave) deviation now present  Ventricular premature complex(es) no longer present   Early repolarization no longer present  Possible ischemia no longer present  Prolonged QT interval no longer  present     EKG   Result Value Ref Range    Report       [facility] Emergency Dept.    Test Date:  2020  Pt Name:    STEVEN YIP            Department: ER  MRN:        [id number]                      Room:       RD 12  Gender:     Male                         Technician: [id number]  :        1975                   Requested By:ANIVAL AN  Order #:    [id number]                    Reading MD:    Measurements  Intervals                                Axis  Rate:       63                           P:          15  UT:         152                          QRS:        10  QRSD:       92                           T:          -3  QT:         452  QTc:        463    Interpretive Statements  SINUS RHYTHM  LVH WITH SECONDARY REPOLARIZATION ABNORMALITY  ANTERIOR INFARCT, OLD  Compared to ECG 2020 19:52:52  Left ventricular hypertrophy now present  Early repolarization now present  Myocardial infarct finding now present  Atrial fibrillation no longer present  Atrial flutter no longer present  AV block, complete ( third-degree) no longer present  Intraventricular conduction delay no longer present  ST (T wave) deviation no longer present         Results for orders placed or performed during the hospital encounter of 20   CBC WITH DIFFERENTIAL   Result Value Ref Range    WBC 11.0 (H) 4.8 - 10.8 K/uL    RBC 5.08 4.70 - 6.10 M/uL    Hemoglobin 15.8 14.0 - 18.0 g/dL    Hematocrit 46.1 42.0 - 52.0 %    MCV 90.7 81.4 - 97.8 fL    MCH 31.1 27.0 - 33.0 pg    MCHC 34.3 33.7 - 35.3 g/dL    RDW 41.3 35.9 - 50.0 fL    Platelet Count 284 164 - 446 K/uL    MPV 9.3 9.0 - 12.9 fL    Neutrophils-Polys 56.60 44.00 - 72.00 %    Lymphocytes 31.80 22.00 - 41.00 %    Monocytes 8.80 0.00 - 13.40 %    Eosinophils 2.10 0.00 - 6.90 %    Basophils 0.40 0.00 - 1.80 %    Immature Granulocytes 0.30 0.00 - 0.90 %    Nucleated RBC 0.00 /100 WBC    Neutrophils (Absolute) 6.22 1.82 - 7.42 K/uL    Lymphs (Absolute)  3.49 1.00 - 4.80 K/uL    Monos (Absolute) 0.96 (H) 0.00 - 0.85 K/uL    Eos (Absolute) 0.23 0.00 - 0.51 K/uL    Baso (Absolute) 0.04 0.00 - 0.12 K/uL    Immature Granulocytes (abs) 0.03 0.00 - 0.11 K/uL    NRBC (Absolute) 0.00 K/uL   TROPONIN   Result Value Ref Range    Troponin T 47 (H) 6 - 19 ng/L   COMP METABOLIC PANEL   Result Value Ref Range    Sodium 141 135 - 145 mmol/L    Potassium 4.0 3.6 - 5.5 mmol/L    Chloride 104 96 - 112 mmol/L    Co2 23 20 - 33 mmol/L    Anion Gap 14.0 7.0 - 16.0    Glucose 101 (H) 65 - 99 mg/dL    Bun 23 (H) 8 - 22 mg/dL    Creatinine 1.24 0.50 - 1.40 mg/dL    Calcium 10.1 8.5 - 10.5 mg/dL    AST(SGOT) 23 12 - 45 U/L    ALT(SGPT) 35 2 - 50 U/L    Alkaline Phosphatase 108 (H) 30 - 99 U/L    Total Bilirubin 0.5 0.1 - 1.5 mg/dL    Albumin 4.8 3.2 - 4.9 g/dL    Total Protein 8.1 6.0 - 8.2 g/dL    Globulin 3.3 1.9 - 3.5 g/dL    A-G Ratio 1.5 g/dL   LIPASE   Result Value Ref Range    Lipase 24 11 - 82 U/L   PROTHROMBIN TIME (INR)   Result Value Ref Range    PT 12.5 12.0 - 14.6 sec    INR 0.91 0.87 - 1.13   APTT   Result Value Ref Range    APTT 25.1 24.7 - 36.0 sec   ESTIMATED GFR   Result Value Ref Range    GFR If African American >60 >60 mL/min/1.73 m 2    GFR If Non African American >60 >60 mL/min/1.73 m 2   EKG (NOW)   Result Value Ref Range    Report       [facility] Emergency Dept.    Test Date:  2020  Pt Name:    STEVEN YIP            Department: ER  MRN:        [id number]                      Room:  Gender:     Male                         Technician: [id number]  :        1975                   Requested By:ER TRIAGE PROTOCOL  Order #:    [id number]                    Reading MD:    Measurements  Intervals                                Axis  Rate:       77                           P:  IL:                                      QRS:        10  QRSD:       116                          T:          -12  QT:         448  QTc:        508    Interpretive  Statements  A-FLUTTER/FIBRILLATION W/ COMPLETE AV BLOCK  NONSPECIFIC INTRAVENTRICULAR CONDUCTION DELAY  MINIMAL ST DEPRESSION, INFERIOR LEADS  Compared to ECG 2020 19:37:34  AV block, complete (third-degree) now present  Intraventricular conduction delay now present  ST (T wave) deviation now present  Ventricular premature complex(es) no longer present   Early repolarization no longer present  Possible ischemia no longer present  Prolonged QT interval no longer present     EKG   Result Value Ref Range    Report       [facility] Emergency Dept.    Test Date:  2020  Pt Name:    STEVEN YIP            Department: ER  MRN:        [id number]                      Room:        12  Gender:     Male                         Technician: [id number]  :        1975                   Requested By:ANIVAL AN  Order #:    [id number]                    Reading MD:    Measurements  Intervals                                Axis  Rate:       63                           P:          15  NE:         152                          QRS:        10  QRSD:       92                           T:          -3  QT:         452  QTc:        463    Interpretive Statements  SINUS RHYTHM  LVH WITH SECONDARY REPOLARIZATION ABNORMALITY  ANTERIOR INFARCT, OLD  Compared to ECG 2020 19:52:52  Left ventricular hypertrophy now present  Early repolarization now present  Myocardial infarct finding now present  Atrial fibrillation no longer present  Atrial flutter no longer present  AV block, complete ( third-degree) no longer present  Intraventricular conduction delay no longer present  ST (T wave) deviation no longer present           COURSE & MEDICAL DECISION MAKING  Pertinent Labs & Imaging studies reviewed. (See chart for details)  Patient has a classic story for unstable angina versus a hypertensive crisis.  Cardiology consultation was obtained with Dr. Moser at 9:10 PM.  Patient [facility]  Troponin was borderline positive at 47.    Patient was started on a heparin drip, nitrates, aspirin, fentanyl.  He is currently chest pain-free    Patient is critically ill.   The patient continues to have: Unstable angina  The vital organ system that is affected is the: Heart  If untreated there is a high chance of deterioration into: STEMI  And eventually death.   The critical care that I am providing today is: Involving medical management consultation  The critical that has been undertaken is medically complex.   There has been no overlap in critical care time.   Critical Care Time not including procedures: 30 minutes        FINAL IMPRESSION  1.  Non-STEMI  2.  Hypertensive emergency  3.      Please note that this dictation was created using voice recognition software. I have worked with consultants from the vendor as well as technical experts from [facility] to optimize the interface. I have made every reasonable attempt to correct obvious errors, but I expect that there are errors of grammar and possibly content that I did not discover before finalizing the note.      Electronically signed by: Derik Rivera M.D., 5/22/2020 9:00 PM

## 2020-05-23 NOTE — CARE PLAN
Problem: Communication  Goal: The ability to communicate needs accurately and effectively will improve  Outcome: PROGRESSING AS EXPECTED  Intervention: [place] patient and significant other/support system to call light to alert staff of needs  Flowsheets (Taken 5/23/2020 [id number])  Oriented to:: All of the Following : Location of Bathroom, Visiting Policy, Unit Routine, Call Light and Bedside Controls, Bedside Rail Policy, Smoking Policy, Rights and Responsibilities, Bedside Report, and Patient Education Notebook     Problem: Safety  Goal: Will remain free from injury  Outcome: PROGRESSING AS EXPECTED

## 2020-05-23 NOTE — PROGRESS NOTES
Dr Moser updated regarding significant increase in trop. Patient resting comfortably, denies CP at this time.

## 2020-05-23 NOTE — CONSULTS
Reason for Consult:  Asked by Dr Perry Rivera to see this patient with  Acute coronary syndrome  Patient's PCP: No primary care provider on file.    CC:   Chief Complaint   Patient presents with   • Chest Pressure     began 15 mins ago, radiates to L arm. Hx MI years ago, with 4 stents placed. + diaphoresis. denies SOB, N/V       HPI: This is a 44-year-old gentleman with a history of early coronary disease prior smoking follows with [facility] but had a intermittent lapse in insurance in the past he reports history of 4 stents he believes 2 years ago but is unsure of the specific date and have been doing okay but then developed chest pain yesterday presented was found to have severe hypertension with chest pains EKG was okay troponin mildly positive he is on heparin appropriately did well overnight I was called for additional episode of chest pain short-lived EKG shows some dynamic T wave changes    For his chest pain he had multiple episodes over the past week lasting up to 15 minutes pain was initially improved here with nitroglycerin and narcotics pain often had shortness of breath severe in nature but then relieved to normal    Medications / Drug list prior to admission:  No current facility-administered medications on file prior to encounter.      Current Outpatient Medications on File Prior to Encounter   Medication Sig Dispense Refill   • carvedilol (COREG) 12.5 MG Tab      • lisinopril (PRINIVIL) 10 MG Tab      • amLODIPine (NORVASC) 10 MG Tab every day.     • atorvastatin (LIPITOR) 20 MG Tab Take 40 mg by mouth every day. Indications: High Amount of Fats in the Blood     • aspirin (ASA) 81 MG Chew Tab chewable tablet Take 32 mg by mouth every day.         Current list of administered Medications:    Current Facility-Administered Medications:   •  calcium carbonate (TUMS) chewable tab 500 mg, 500 mg, Oral, TID PRN, Malika Monsivais M.D.  •  [COMPLETED] heparin injection 6,000 Units, 6,000  Units, Intravenous, Once, 6,000 Units at 05/22/20 2024 **AND** heparin injection 3,200 Units, 3,200 Units, Intravenous, PRN **AND** heparin infusion 25,000 units in 500 mL 0.45% NACL, , Intravenous, Continuous, Last Rate: 24 mL/hr at 05/23/20 0656, 1,200 Units/hr at 05/23/20 0656 **AND** Protocol 440 Heparin Weight Based DO NOT GIVE ANY HEPARIN BOLUS TO STROKE PATIENT, , , CONTINUOUS **AND** Protocol 440 Heparin Weight Based Discontinue Enoxaparin (Lovenox), Dabigatran (Pradaxa), Rivaroxaban (Xarelto), Apixaban (Eliquis), Edoxaban (Savaysa, Lixiana), Fondaparinux (Arixtra) and Argatroban prior to heparin administration, , , CONTINUOUS **AND** Protocol 440 Heparin Weight Based Draw baseline aPTT, PT, and platelet count if not already done, , , CONTINUOUS **AND** Protocol 440 Heparin Weight Based Draw aPTT 6 hours after beginning infusion. , , , CONTINUOUS **AND** Protocol 440 Heparin Weight Based Draw Platelet count every three days. Contact MD if platelet is 50% lower than baseline count., , , CONTINUOUS **AND** Protocol 440 Heparin Weight Based Record Patient Data, , , CONTINUOUS **AND** Protocol 440 Heparin Weight Based INSTRUCTIONS, , , CONTINUOUS **AND** Protocol 440 Heparin Weight Based Review aPTT results 6 hours after infusion is begun as detailed, , , CONTINUOUS **AND** Protocol 440 Heparin Weight Based Adjust heparin to maintain aPTT between 55-96 sec, , , CONTINUOUS **AND** Protocol 440 Heparin Weight Based Order aPTT 6 hours after any rate change or hold until aPTT is therapeutic (55-96 seconds), , , CONTINUOUS **AND** Protocol 440 Heparin Weight Based Documentation and verification, , , CONTINUOUS, Derik Rivera M.D.  •  lisinopril (PRINIVIL) tablet 10 mg, 10 mg, Oral, Q DAY, Ana Paula Lott M.D., 10 mg at 05/23/20 0516  •  carvedilol (COREG) tablet 12.5 mg, 12.5 mg, Oral, BID WITH MEALS, Ana Paula Lott M.D., 12.5 mg at 05/23/20 0819  •  amLODIPine (NORVASC) tablet 10 mg, 10 mg, Oral, Q DAY, Ana Paula POLANCO  WILLY Lott, 10 mg at 05/23/20 0516  •  hydrALAZINE (APRESOLINE) injection 10 mg, 10 mg, Intravenous, Q6HRS PRN, Ana Paula Lott M.D., 10 mg at 05/22/20 2210  •  senna-docusate (PERICOLACE or SENOKOT S) 8.6-50 MG per tablet 2 Tab, 2 Tab, Oral, BID, 2 Tab at 05/23/20 0516 **AND** polyethylene glycol/lytes (MIRALAX) PACKET 1 Packet, 1 Packet, Oral, QDAY PRN **AND** magnesium hydroxide (MILK OF MAGNESIA) suspension 30 mL, 30 mL, Oral, QDAY PRN **AND** bisacodyl (DULCOLAX) suppository 10 mg, 10 mg, Rectal, QDAY PRN, Ana Paula Lott M.D.  •  aspirin EC (ECOTRIN) tablet 81 mg, 81 mg, Oral, DAILY, Ana Paula Lott M.D., 81 mg at 05/23/20 0516  •  atorvastatin (LIPITOR) tablet 40 mg, 40 mg, Oral, Q EVENING, Ana Paula Lott M.D.  •  nitroglycerin (NITROSTAT) tablet 0.4 mg, 0.4 mg, Sublingual, Q5 MIN PRN, Ana Paula Lott M.D.  •  morphine (pf) 4 MG/ML injection 2-4 mg, 2-4 mg, Intravenous, Q5 MIN PRN, Ana Paula Lott M.D., 2 mg at 05/23/20 0606    Past Medical History:   Diagnosis Date   • Anginal syndrome ([facility])    • At risk for sleep apnea    • Back pain    • Coronary artery disease due to lipid rich plaque - post PCI    • Hypertension    • MI (myocardial infarction) ([facility]) 2018   • NSTEMI (non-ST elevated myocardial infarction) ([facility]) 5/22/2020   • Psychiatric problem     Anxiety   • Stented coronary artery        Past Surgical History:   Procedure Laterality Date   • OTHER      vasectomy   • OTHER CARDIAC SURGERY         Family History   Problem Relation Age of Onset   • Heart Disease Neg Hx      Patient family history was personally reviewed, no pertinent family history to current presentation    Social History     Tobacco Use   • Smoking status: Former Smoker   • Smokeless tobacco: Never Used   Substance Use Topics   • Alcohol use: Yes     Frequency: 2-4 times a month     Drinks per session: 5 or 6     Binge frequency: Less than monthly   • Drug use: Never   He stopped smoking when he had His prior stents    ALLERGIES:  No  "Known Allergies    Review of systems:  A complete review of symptoms was reviewed with patient. This is reviewed in H&P and PMH. ALL OTHERS reviewed and negative    Physical exam:  Patient Vitals for the past 24 hrs:   BP Temp Temp src Pulse Resp SpO2 Height Weight   05/23/20 0800 157/90 37.4 °C (99.3 °F) Temporal 79 17 96 % -- --   05/23/20 0400 139/89 36.7 °C (98.1 °F) Temporal 81 18 95 % -- --   05/23/20 0000 131/85 36.9 °C (98.5 °F) Temporal 72 16 97 % -- --   05/22/20 2318 -- -- -- -- -- -- -- 100.9 kg (222 lb 7.1 oz)   05/22/20 2255 -- -- -- -- -- -- 1.803 m (5' 11\") 100.9 kg (222 lb 7.1 oz)   05/22/20 2244 (!) 167/101 -- -- -- -- -- -- --   05/22/20 2242 (!) 166/101 36.6 °C (97.8 °F) Temporal 87 17 97 % -- 100.4 kg (221 lb 5.5 oz)   05/22/20 2211 (!) 189/110 36.6 °C (97.9 °F) -- 78 16 96 % -- --   05/22/20 2201 (!) 202/121 -- -- 80 16 97 % -- --   05/22/20 2146 153/97 -- -- 77 18 96 % -- --   05/22/20 2131 (!) 168/101 -- -- 78 16 93 % -- --   05/22/20 2116 149/98 -- -- 76 16 94 % -- --   05/22/20 2101 155/102 -- -- 72 18 93 % -- --   05/22/20 2046 (!) 164/102 -- -- 66 18 93 % -- --   05/22/20 2016 (!) 183/113 -- -- 74 18 100 % -- --   05/22/20 2007 (!) 199/123 -- -- 77 (!) 24 100 % -- --   05/22/20 2006 (!) 199/123 -- -- 77 -- -- -- --   05/22/20 2002 (!) 195/119 -- -- 76 (!) 57 100 % -- --   05/22/20 1941 -- -- -- -- -- -- 1.803 m (5' 11\") 99.8 kg (220 lb)   05/22/20 1931 (!) 177/113 36.6 °C (97.8 °F) -- 75 16 98 % -- --     General: No acute distress.   EYES: no jaundice  HEENT: OP clear   Neck: No bruits No JVD.   CVS:   RRR. S1 + S2. No M/R/G  Resp: CTAB. No wheezing or crackles/rhonchi.  Abdomen: Soft, NT, ND,  Skin: Grossly nothing acute no obvious rashes  Neurological: Alert, Moves all extremities, no cranial nerve defects on limited exam  Extremities: Pulse 2+ in b/l LE. no edema. No cyanosis.       Data:  Laboratory studies personally reviewed by me:  Recent Results (from the past 24 hour(s))   CBC " WITH DIFFERENTIAL    Collection Time: 05/22/20  7:52 PM   Result Value Ref Range    WBC 11.0 (H) 4.8 - 10.8 K/uL    RBC 5.08 4.70 - 6.10 M/uL    Hemoglobin 15.8 14.0 - 18.0 g/dL    Hematocrit 46.1 42.0 - 52.0 %    MCV 90.7 81.4 - 97.8 fL    MCH 31.1 27.0 - 33.0 pg    MCHC 34.3 33.7 - 35.3 g/dL    RDW 41.3 35.9 - 50.0 fL    Platelet Count 284 164 - 446 K/uL    MPV 9.3 9.0 - 12.9 fL    Neutrophils-Polys 56.60 44.00 - 72.00 %    Lymphocytes 31.80 22.00 - 41.00 %    Monocytes 8.80 0.00 - 13.40 %    Eosinophils 2.10 0.00 - 6.90 %    Basophils 0.40 0.00 - 1.80 %    Immature Granulocytes 0.30 0.00 - 0.90 %    Nucleated RBC 0.00 /100 WBC    Neutrophils (Absolute) 6.22 1.82 - 7.42 K/uL    Lymphs (Absolute) 3.49 1.00 - 4.80 K/uL    Monos (Absolute) 0.96 (H) 0.00 - 0.85 K/uL    Eos (Absolute) 0.23 0.00 - 0.51 K/uL    Baso (Absolute) 0.04 0.00 - 0.12 K/uL    Immature Granulocytes (abs) 0.03 0.00 - 0.11 K/uL    NRBC (Absolute) 0.00 K/uL   TROPONIN    Collection Time: 05/22/20  7:52 PM   Result Value Ref Range    Troponin T 47 (H) 6 - 19 ng/L   COMP METABOLIC PANEL    Collection Time: 05/22/20  7:52 PM   Result Value Ref Range    Sodium 141 135 - 145 mmol/L    Potassium 4.0 3.6 - 5.5 mmol/L    Chloride 104 96 - 112 mmol/L    Co2 23 20 - 33 mmol/L    Anion Gap 14.0 7.0 - 16.0    Glucose 101 (H) 65 - 99 mg/dL    Bun 23 (H) 8 - 22 mg/dL    Creatinine 1.24 0.50 - 1.40 mg/dL    Calcium 10.1 8.5 - 10.5 mg/dL    AST(SGOT) 23 12 - 45 U/L    ALT(SGPT) 35 2 - 50 U/L    Alkaline Phosphatase 108 (H) 30 - 99 U/L    Total Bilirubin 0.5 0.1 - 1.5 mg/dL    Albumin 4.8 3.2 - 4.9 g/dL    Total Protein 8.1 6.0 - 8.2 g/dL    Globulin 3.3 1.9 - 3.5 g/dL    A-G Ratio 1.5 g/dL   LIPASE    Collection Time: 05/22/20  7:52 PM   Result Value Ref Range    Lipase 24 11 - 82 U/L   proBrain Natriuretic Peptide, NT    Collection Time: 05/22/20  7:52 PM   Result Value Ref Range    NT-proBNP 471 (H) 0 - 125 pg/mL   PROTHROMBIN TIME (INR)    Collection Time:  20  7:52 PM   Result Value Ref Range    PT 12.5 12.0 - 14.6 sec    INR 0.91 0.87 - 1.13   APTT    Collection Time: 20  7:52 PM   Result Value Ref Range    APTT 25.1 24.7 - 36.0 sec   ESTIMATED GFR    Collection Time: 20  7:52 PM   Result Value Ref Range    GFR If African American >60 >60 mL/min/1.73 m 2    GFR If Non African American >60 >60 mL/min/1.73 m 2   EKG (NOW)    Collection Time: 20  7:52 PM   Result Value Ref Range    Report       [facility] Emergency Dept.    Test Date:  2020  Pt Name:    STEVEN YIP            Department: ER  MRN:        [id number]                      Room:  Gender:     Male                         Technician: [id number]  :        1975                   Requested By:ER TRIAGE PROTOCOL  Order #:    [id number]                    Reading MD:    Measurements  Intervals                                Axis  Rate:       77                           P:  HI:                                      QRS:        10  QRSD:       116                          T:          -12  QT:         448  QTc:        508    Interpretive Statements  A-FLUTTER/FIBRILLATION W/ COMPLETE AV BLOCK  NONSPECIFIC INTRAVENTRICULAR CONDUCTION DELAY  MINIMAL ST DEPRESSION, INFERIOR LEADS  Compared to ECG 2020 19:37:34  AV block, complete (third-degree) now present  Intraventricular conduction delay now present  ST (T wave) deviation now present  Ventricular premature complex(es) no longer present   Early repolarization no longer present  Possible ischemia no longer present  Prolonged QT interval no longer present     EKG    Collection Time: 20  8:35 PM   Result Value Ref Range    Report       [facility] Emergency Dept.    Test Date:  2020  Pt Name:    STEVEN YIP            Department: ER  MRN:        [id number]                      Room:        12  Gender:     Male                         Technician: [id number]  :        1975                    Requested By:ANIVAL AN  Order #:    [id number]                    Reading MD:    Measurements  Intervals                                Axis  Rate:       63                           P:          15  AK:         152                          QRS:        10  QRSD:       92                           T:          -3  QT:         452  QTc:        463    Interpretive Statements  SINUS RHYTHM  LVH WITH SECONDARY REPOLARIZATION ABNORMALITY  ANTERIOR INFARCT, OLD  Compared to ECG 2020 19:52:52  Left ventricular hypertrophy now present  Early repolarization now present  Myocardial infarct finding now present  Atrial fibrillation no longer present  Atrial flutter no longer present  AV block, complete ( third-degree) no longer present  Intraventricular conduction delay no longer present  ST (T wave) deviation no longer present     EKG    Collection Time: 20 11:22 PM   Result Value Ref Range    Report       [facility]    Test Date:  2020  Pt Name:    STEVEN YIP            Department: 171  MRN:        [id number]                      Room:       T724  Gender:     Male                         Technician: ARIADNE  :        1975                   Requested By:RON CRUZ  Order #:    [id number]                    Reading MD: Humberto Moser MD    Measurements  Intervals                                Axis  Rate:       68                           P:          42  AK:         152                          QRS:        31  QRSD:       88                           T:          84  QT:         452  QTc:        481    Interpretive Statements  SINUS RHYTHM  PROBABLE LATERAL INFARCT, AGE INDETERMINATE  ABNORMAL T, CONSIDER ISCHEMIA, ANTERIOR LEADS  BORDERLINE PROLONGED QT INTERVAL  Compared to ECG 2020 20:35:15  T-wave abnormality now present  Possible ischemia now present  Electronically Signed On 2020 3:25:59 PDT by Humberto Moser MD     Lipid Profile (Tomorrow AM)     Collection Time: 20 12:07 AM   Result Value Ref Range    Cholesterol,Tot 134 100 - 199 mg/dL    Triglycerides 75 0 - 149 mg/dL    HDL 46 >=40 mg/dL    LDL 73 <100 mg/dL   CBC without Differential    Collection Time: 20 12:07 AM   Result Value Ref Range    WBC 11.6 (H) 4.8 - 10.8 K/uL    RBC 5.03 4.70 - 6.10 M/uL    Hemoglobin 15.8 14.0 - 18.0 g/dL    Hematocrit 46.8 42.0 - 52.0 %    MCV 93.0 81.4 - 97.8 fL    MCH 31.4 27.0 - 33.0 pg    MCHC 33.8 33.7 - 35.3 g/dL    RDW 43.8 35.9 - 50.0 fL    Platelet Count 262 164 - 446 K/uL    MPV 9.6 9.0 - 12.9 fL   Basic Metabolic Panel (BMP)    Collection Time: 20 12:07 AM   Result Value Ref Range    Sodium 140 135 - 145 mmol/L    Potassium 4.1 3.6 - 5.5 mmol/L    Chloride 104 96 - 112 mmol/L    Co2 21 20 - 33 mmol/L    Glucose 102 (H) 65 - 99 mg/dL    Bun 22 8 - 22 mg/dL    Creatinine 1.15 0.50 - 1.40 mg/dL    Calcium 9.6 8.5 - 10.5 mg/dL    Anion Gap 15.0 7.0 - 16.0   TROPONIN    Collection Time: 20 12:07 AM   Result Value Ref Range    Troponin T 197 (H) 6 - 19 ng/L   ESTIMATED GFR    Collection Time: 20 12:07 AM   Result Value Ref Range    GFR If African American >60 >60 mL/min/1.73 m 2    GFR If Non African American >60 >60 mL/min/1.73 m 2   MAGNESIUM    Collection Time: 20  3:02 AM   Result Value Ref Range    Magnesium 2.1 1.5 - 2.5 mg/dL   APTT    Collection Time: 20  3:02 AM   Result Value Ref Range    APTT 66.8 (H) 24.7 - 36.0 sec   EKG in four (4) hours    Collection Time: 20  5:49 AM   Result Value Ref Range    Report       [facility]    Test Date:  2020  Pt Name:    STEVEN YIP            Department: 171  MRN:        [id number]                      Room:       T724  Gender:     Male                         Technician: ARIADNE  :        1975                   Requested By:RON CRUZ  Order #:    [id number]                    Reading MD: Cleveland Schwab MD    Measurements  Intervals                                 Axis  Rate:       70                           P:          9  OK:         156                          QRS:        11  QRSD:       88                           T:          73  QT:         424  QTc:        458    Interpretive Statements  SINUS RHYTHM  ABNORMAL T, CONSIDER ISCHEMIA, ANT-LAT LEADS  Compared to ECG 05/22/2020 23:22:59  Myocardial infarct finding no longer present  T-wave abnormality still present  Electronically Signed On 5- 7:38:45 PDT by Cleveland Schwab MD     APTT    Collection Time: 05/23/20  5:51 AM   Result Value Ref Range    APTT 64.1 (H) 24.7 - 36.0 sec   TROPONIN    Collection Time: 05/23/20  5:53 AM   Result Value Ref Range    Troponin T 563 (H) 6 - 19 ng/L       Imaging:  DX-CHEST-PORTABLE (1 VIEW)   Final Result      1.  Mild cardiomegaly.   2.  No acute cardiopulmonary disease.      EC-ECHOCARDIOGRAM COMPLETE W/O CONT    (Results Pending)   CL-LEFT HEART CATHETERIZATION WITH POSSIBLE INTERVENTION    (Results Pending)   CL-LEFT HEART CATHETERIZATION WITH POSSIBLE INTERVENTION    (Results Pending)           EKG : personally reviewed by me  Sinus rhythm initial was okay overnight had some biphasic T waves anteriorly    All pertinent features of laboratory and imaging reviewed including primary images where applicable      Principal Problem:    NSTEMI (non-ST elevated myocardial infarction) (HCC) POA: Yes  Active Problems:    Pain in the chest POA: Unknown    Malignant hypertension POA: Unknown    Coronary artery disease due to lipid rich plaque - post PCI POA: Yes    Stented coronary artery (Chronic) POA: Yes  Resolved Problems:    * No resolved hospital problems. *      Assessment / Plan:  Non-STEMI  Dynamic EKG changes and elevated troponin continue to increase in addition to medical therapy we will do urgent left heart catheterization with possible coronary intervention he understands risks and benefits and agrees to proceed    Perfect care      I personally discussed  his case with  Dr Bernardino Christianson M.D.      It is my pleasure to participate in the care of Mr. Crawford.  Please do not hesitate to contact me with questions or concerns.    Humberto Moser MD PhD [facility]  Cardiologist [facility]    5/23/2020    Please note that this dictation was created using voice recognition software. There may be errors I did not discover before finalizing the note.

## 2020-05-23 NOTE — OP REPORT
Cardiac catheterization report    Procedure date: 5/23/2020    Referring physician: Dr. Humberto Moser    Pre-operative Diagnosis:  1. NSTEMI  2. History of RCA stents    Post-operative Diagnosis:   1. Normal LV systolic function with anterior wall hypokinesis  2. NSTEMI due to 99% proximal left anterior descending artery (LAD) stenosis at origin of 2 mm first diagonal branch which also had about 80-90% ostial stenosis with SHINE II flow into the LAD  3. 70-80% proximal left circumflex artery (LCX) stenosis  4. Patents multiple previously placed RCA stents  5. Status post stenting of proximal LAD with 3x15 mm [place] and mid LAD with 3x8 mm Albaro drug eluting stent with no significant residual stenosis and SHINE III flow  6. Status post stenting of proximal LCX with 2.5x15 mm Albaro EVELINE with no significant residual stenosis and SHINE III    Procedure:     Supervision of moderate conscious sedation    Complications: None    Description of Procedure:  After informed consent was obtained, the patient was brought to cardiac catheterization laboratory in fasting state.   Sourav test was carried out on the right hand and was found to be negative.  Right wrist and right groin were then prepped and drapped in sterile fashion.  Versed and fentanyl were used for conscious sedation.  Lidocaine 2% was used to anesthetize the area.  A 6 [place] Terumo sheath was then placed in the right radial artery using Seldinger technique.  A 2.5 mg of verapamil, 100 microgram of nitroglycerine and 3000 units of heparin were administered into the radial sheath.    Selective angiography of the right and left coronary artery were performed in multiple views using 5 Divehi TIG catheter.   The TIG catheter was used to enter the left ventricle.    Left ventricular pressure was recorded.   Left ventriculography was performed using 16 cc of contrast injected over 2 seconds.     After reviewing of the diagnostic angiography, it was felt that intervention  of the LAD artery was indicated.  Bivalrudin was then started for anticoagulation.  A 6 [place] EBU 3.5 guide catheter was used.  A 0.014 BMW wire was then advanced pass the stenosis into distal LAD and Whisper wire was advanced into first diagonal branch (D1).  The LAD lesion was dilated with a 2x12 mm balloon.  The ostial D1 was then dilated with the same balloon  A 3x15 mm Albaro drug eluting stent was deployed.  The Whisper wire was withdrawn and recrossed back into D1.  The ostial D1 was then re-dilated with 2 mm balloon  Subsequent angiography showed no significant residual stenosis with SHINE III flow.     We then decided to proceed with PCI of the lCX.  The BMW wire was subsequently directed into distal OM.  The lesion was dilated with 2 mm balloon.  A 2.5x15b mm [place] drug eluting stent was placed and post dilated with 2.5x12 mm non compliant balloon.  Final angiography confirmed good result.   The radial sheath was subsequently removed.  Hemostasis was obtained using a Terumo TR wrist band.  The patient was then given a loading dose of Brillinta.  Bivalirudin was subsequently reduced to low dose infusion.  The patient tolerated procedure well and left cardiac catheterization laboratory in stable condition.    I supervised monitoring the patient under moderate conscious sedation beginning at 9:44AM until the end of the case at 10:45AM.    Findings:    There is no gradient across aortic valve.  Left ventricular end-diastolic pressure was 11 mmHg.    Left ventriculography showed hypokinesis of the anterior wall.  Overall LV systolic function is mildly reduced .  Ejection fraction is calculated to be 40%.    Two vessel coronary artery disease    This is right dominant system.    Left main is large and without flow limiting disease.  It bifurcated into left anterior descending and left circumflex artery.     Left anterior descending artery is large caliber vessel and extends to the apex.  It gives rises to several  small to medium sized diagonal branches.  At the beginning of the case, there was 99% stenosis in the proximal portion of the left anterior descending artery (LAD) at the origin of first diagonal branch.  There was also 80-90% stenosis at the fist diagonal branch ostium.  There is no signficant disease in the left anterior descending artery or its major branches.  The antegrade flow was sluggish at SHINE II.    Left circumflex artery is medium in caliber.   It gives rise to one distal obtuse marginal branch.  There was 70-80% stenosis in the proximal portion of the left circumflex artery (LCX).  The antegrade flow is normal.    Right coronary artery (RCA) is large caliber. It gives rise to several small acute marginal branches, posterior descending artery and posterolateral branch.  Multiple stents were noted in the mid RCA.They were widely patent.  There is no significant disease in the right coronary artery or its major branches.  The antegrade flow is normal.    After intervention, there was 0% residual stenosis in LAD or LCX with SHINE III flow.      Plans;  Dual antiplatelet therapy for one year.   Continue low-dose bivalirudin infusion for 4 hours.   Risk factor modification.  Limit right wrist movement for 24 hours.        Ayla Ramirez M.D.

## 2020-05-23 NOTE — PROGRESS NOTES
Patient returned from cath lab. VSS. Post-op vitals initiated. Insertion site clean, dry, soft. No complaints of pain. Monitor room notified. Will continue to monitor.

## 2020-05-23 NOTE — PROGRESS NOTES
Report received at bedside in ED by RACHAEL Mcgee, patient care assumed. Placed on ZOLL monitor and transferred to unit. Report taken from RACHAEL Mcgee at bedside. Tele box on. Monitor room informed. POC updated on white board Patient states pain is 0/10. VS taken. Fall precautions in place with bed in lowest position, treaded sock slippers on, and call light within reach. Will continue assess and monitor cognitive function and any physiological changes.

## 2020-05-23 NOTE — PROGRESS NOTES
Report received at bedside in ED by RACHAEL Mcgee, patient care assumed. Placed on ZOLL monitor and transferred to unit. Received report from RACHAEL Mcgee. Tele box on. Monitor room informed. POC updated on white board Patient states pain is 0/10. VS taken. Fall precautions in place with bed in lowest position, treaded sock slippers on, and call light within reach. Will continue assess and monitor cognitive function and any physiological changes.

## 2020-05-23 NOTE — ASSESSMENT & PLAN NOTE
Patient with reported history of prior MI with diagnosed coronary artery disease status post stent placement although I do not have these records to confirm.  Additionally, he has uncontrolled blood pressure upon presentation in the emergency room.  He has been started on a heparin drip.  I will continue this and admit him to the telemetry floor for close monitoring with serial troponin levels and EKGs.  I will control his blood pressure.  I have started him on an aspirin and a statin and he will receive as needed morphine, oxygen, and nitroglycerin for chest pain.  I will check a lipid panel.  Dr. Moser of cardiology was consulted by the emergency room physician and recommends continued trending of cardiac enzymes and heparin drip.  If patient improves then we will plan to further risk stratify in the morning with a nuclear medicine stress test.  If cardiac enzymes worsen, then I defer to cardiology regarding need for cardiac catheterization.  Patient will be kept n.p.o. at midnight.

## 2020-05-23 NOTE — PROGRESS NOTES
Pt having chest discomfort, called Monitor techs, ST elevation, Ordered stat EKG, informed rapid nurse RN Sary Darling bedside. There is st elevation, informed Cardiologist and possible cath in morning and Troponin ordered.

## 2020-05-23 NOTE — CARE PLAN
Problem: Communication  Goal: The ability to communicate needs accurately and effectively will improve  Outcome: PROGRESSING AS EXPECTED     Problem: Safety  Goal: Will remain free from injury  Outcome: PROGRESSING AS EXPECTED  Goal: Will remain free from falls  Outcome: PROGRESSING AS EXPECTED     Problem: Bowel/Gastric:  Goal: Normal bowel function is maintained or improved  Outcome: PROGRESSING AS EXPECTED     Problem: Pain Management  Goal: Pain level will decrease to patient's comfort goal  Outcome: PROGRESSING AS EXPECTED

## 2020-05-23 NOTE — PROGRESS NOTES
2 RN Skin Check Completed with RACHAEL Rosales;    Noted heels dry and flaky bilat. Otherwise all other skin surfaces intact and unremarkable.

## 2020-05-23 NOTE — PROGRESS NOTES
[facility] Medicine Daily Progress Note    Date of Service  5/23/2020    Chief Complaint  44 y.o. male admitted 5/22/2020 with chest pain      Interval Problem Update  He denies any active chest pain or shortness of breath or dizziness    No Fever chills or cough      Troponin danni to 563    Case discussed with cardiology Dr. Moser    Coronary angiogram today    Consultants/Specialty  Dr dixon cards    Code Status  full    Disposition  home    Review of Systems  Review of Systems   Constitutional: Negative for chills, fever and weight loss.   HENT: Negative for ear discharge, ear pain, hearing loss and tinnitus.    Eyes: Negative for blurred vision and double vision.   Respiratory: Negative for cough, hemoptysis and sputum production.    Cardiovascular: Negative for chest pain, palpitations and orthopnea.   Gastrointestinal: Negative for abdominal pain, heartburn, nausea and vomiting.   Genitourinary: Negative for dysuria, frequency and urgency.   Musculoskeletal: Negative for myalgias and neck pain.   Neurological: Negative for dizziness, tingling, tremors, sensory change, speech change and headaches.   Psychiatric/Behavioral: Negative for substance abuse.        Physical Exam  Temp:  [36.6 °C (97.8 °F)-37.4 °C (99.3 °F)] 37.4 °C (99.3 °F)  Pulse:  [66-87] 79  Resp:  [16-57] 17  BP: (131-202)/() 157/90  SpO2:  [93 %-100 %] 96 %    Physical Exam  Constitutional:       General: He is not in acute distress.     Appearance: He is not ill-appearing, toxic-appearing or diaphoretic.   HENT:      Head: Normocephalic and atraumatic.      Nose: No rhinorrhea.      Mouth/Throat:      Pharynx: No posterior oropharyngeal erythema.   Eyes:      General: No scleral icterus.        Left eye: No discharge.   Neck:      Musculoskeletal: Normal range of motion. No neck rigidity or muscular tenderness.      Vascular: No carotid bruit.   Cardiovascular:      Rate and Rhythm: Normal rate and regular rhythm.      Pulses: Normal  pulses.      Heart sounds: No murmur. No friction rub. No gallop.    Pulmonary:      Effort: Pulmonary effort is normal. No respiratory distress.      Breath sounds: No stridor. No wheezing, rhonchi or rales.   Chest:      Chest wall: No tenderness.   Abdominal:      General: Abdomen is flat. There is no distension.      Tenderness: There is no abdominal tenderness. There is no guarding or rebound.      Hernia: No hernia is present.   Musculoskeletal: Normal range of motion.         General: No swelling, tenderness, deformity or signs of injury.      Left lower leg: No edema.   Lymphadenopathy:      Cervical: No cervical adenopathy.   Skin:     General: Skin is warm.      Capillary Refill: Capillary refill takes 2 to 3 seconds.      Coloration: Skin is not jaundiced or pale.      Findings: No bruising, lesion or rash.   Neurological:      General: No focal deficit present.      Mental Status: He is alert and oriented to person, place, and time.      Cranial Nerves: No cranial nerve deficit.      Sensory: No sensory deficit.      Motor: No weakness.      Coordination: Coordination normal.      Deep Tendon Reflexes: Reflexes normal.   Psychiatric:         Mood and Affect: Mood normal.         Fluids  No intake or output data in the 24 hours ending 05/23/20 1021    Laboratory  Recent Labs     05/22/20 1952 05/23/20  0007   WBC 11.0* 11.6*   RBC 5.08 5.03   HEMOGLOBIN 15.8 15.8   HEMATOCRIT 46.1 46.8   MCV 90.7 93.0   MCH 31.1 31.4   MCHC 34.3 33.8   RDW 41.3 43.8   PLATELETCT 284 262   MPV 9.3 9.6     Recent Labs     05/22/20 1952 05/23/20  0007   SODIUM 141 140   POTASSIUM 4.0 4.1   CHLORIDE 104 104   CO2 23 21   GLUCOSE 101* 102*   BUN 23* 22   CREATININE 1.24 1.15   CALCIUM 10.1 9.6     Recent Labs     05/22/20 1952 05/23/20  0302 05/23/20  0551 05/23/20  0851   APTT 25.1 66.8* 64.1* 67.6*   INR 0.91  --   --   --          Recent Labs     05/23/20  0007   TRIGLYCERIDE 75   HDL 46   LDL 73        Imaging  EC-ECHOCARDIOGRAM COMPLETE W/O CONT   Final Result      DX-CHEST-PORTABLE (1 VIEW)   Final Result      1.  Mild cardiomegaly.   2.  No acute cardiopulmonary disease.      CL-LEFT HEART CATHETERIZATION WITH POSSIBLE INTERVENTION    (Results Pending)   CL-LEFT HEART CATHETERIZATION WITH POSSIBLE INTERVENTION    (Results Pending)        Assessment/Plan  * NSTEMI (non-ST elevated myocardial infarction) (HCC)- (present on admission)  Assessment & Plan  Weight-based heparin    Statin    Beta-blocker    Angiogram today    Echocardiogram    Pain in the chest  Assessment & Plan  Patient with reported history of prior MI with diagnosed coronary artery disease status post stent placement although I do not have these records to confirm.  Additionally, he has uncontrolled blood pressure upon presentation in the emergency room.  He has been started on a heparin drip.  I will continue this and admit him to the telemetry floor for close monitoring with serial troponin levels and EKGs.  I will control his blood pressure.  I have started him on an aspirin and a statin and he will receive as needed morphine, oxygen, and nitroglycerin for chest pain.  I will check a lipid panel.  Dr. Moser of cardiology was consulted by the emergency room physician and recommends continued trending of cardiac enzymes and heparin drip.  If patient improves then we will plan to further risk stratify in the morning with a nuclear medicine stress test.  If cardiac enzymes worsen, then I defer to cardiology regarding need for cardiac catheterization.  Patient will be kept n.p.o. at midnight.    Malignant hypertension- (present on admission)  Assessment & Plan  Resume home Norvasc, Coreg, and lisinopril with additional dose of lisinopril tonight.  Will add PRN IV antihypertensives and adjust home medications accordingly.       VTE prophylaxis: scd

## 2020-05-23 NOTE — DISCHARGE PLANNING
"[facility] does have coupons: Cheapest is [facility] at $356.00    ANASTACIO contacted [phone] to start \"Non-formulary exception.\" The office message reports they are closed, open Mon-Friday. ANASTACIO updated Sayeh APRN.    "

## 2020-05-23 NOTE — ED NOTES
Bedside report given to tele RN. Heparin gtt verified with Tele RN. POC discussed.     VSS. Patient alert and oriented. All belongings with patient at time of departure from floor.

## 2020-05-23 NOTE — DISCHARGE PLANNING
"ANASTACIO called [facility] in [place], [phone] called for Brilinta cost.    Brilinta: needs prior authrorization  [phone]    ANASTACIO called phone for Prior auth. Insurance reports this medication (Brilinta) is not on formulary. Provided three medications:  1.) Clopidogrel  2.) Prasugrel  3.) Dipyidamole    If physican feels like patient needs Brilinta, they will need to start a \"Non-formulary exception\" approval which is done from a different number: [phone]    ANASTACIO to follow up with Ana ELI  "

## 2020-05-23 NOTE — ED TRIAGE NOTES
"Chief Complaint   Patient presents with   • Chest Pressure     began 15 mins ago, radiates to L arm. Hx MI years ago, with 4 stents placed. + diaphoresis. denies SOB, N/V       Pt amb to triage with steady gait for above complaint. Reports taking 1 SLN with no relief. Resp are slightly labored. Pt clutching at chest, reports pressure is getting worse and now radiating to L arm. Pt slightly diaphoretic.  Charge RN notified. Pt to rm 12. EKG complete and shown to ERP      BP (!) 177/113   Pulse 75   Temp 36.6 °C (97.8 °F)   Resp 16   Ht 1.803 m (5' 11\")   Wt 99.8 kg (220 lb)   SpO2 98%   BMI 30.68 kg/m²     "

## 2020-05-23 NOTE — CONSULTS
Pt there with CP HTN indeterminate trop and EKG    Would admit for ENIO cycle enzymes and treat agresively HTN  given report of CAD, heparin gtts and     if enzymes flat and no further CP would do stress test keep NPO p MN, if clinical conditions changes please all me sooner, will plan on seeing tomorrow AM    It is my pleasure to participate in the care of Mr. Crawford.  Please do not hesitate to contact me with questions or concerns.    Humberto Moser MD PhD FAC  Cardiologist [facility]    Please note that this dictation was created using voice recognition software. There may be errors I did not discover before finalizing the note.     5/22/2020  9:09 PM

## 2020-05-23 NOTE — PROGRESS NOTES
Morning report received. Care assumed. Patient awake and alert; laying in bed. Pt on RA. No complaints of pain. Tele monitor on. Call light within reach. Bed at lowest position and locked. No further needs at this time.

## 2020-05-23 NOTE — H&P
Hospital Medicine History & Physical Note    Date of Service  5/22/2020    Primary Care Physician  No primary care provider on file.    Consultants  Dr. Moser, cardiology    Code Status  Full    Chief Complaint  Chief Complaint   Patient presents with   • Chest Pressure     began 15 mins ago, radiates to L arm. Hx MI years ago, with 4 stents placed. + diaphoresis. denies SOB, N/V       History of Presenting Illness  44 y.o. male who presented on 5/22/2020 with chest pain/pressure.  Patient was driving from [place] to [place] when en route he developed centralized, nonradiating chest pain/pressure.  It was associated with diaphoresis but no nausea, palpitations, or shortness of breath.  Patient reports a history of coronary artery disease and states that he had an MI 2 years ago during which time he underwent cardiac catheterization and 4 stents were placed.  This was done at [facility].  He states that he had lost his insurance at one point and went without any medications for approximately 8 months.  In 2019 he regained insurance and was restarted on his antihypertensives, Lipitor, and aspirin but has not been on Plavix.  Unfortunately, he was lost to follow-up to his cardiologist and has not been seen since then.  Patient states that he took aspirin and nitroglycerin without alleviation initially, but by the time he arrived to [place], his symptoms have begun to resolve.  They did restart again in the emergency room after which point he was given medications for his blood pressure, nitroglycerin, and at the time of my visit to his bedside he was chest pain-free.  He denies any aggravating factors.  Otherwise prior to this he was in his usual state of health with no somatic complaints.  Patient states that his blood pressures typically between 160-180 even on treatment.    Review of Systems  Review of Systems   Constitutional: Negative for chills and fever.   HENT: Negative for congestion and sore  throat.    Eyes: Negative for photophobia.   Respiratory: Positive for shortness of breath. Negative for cough and wheezing.    Cardiovascular: Positive for chest pain. Negative for palpitations.   Gastrointestinal: Negative for abdominal pain, diarrhea, nausea and vomiting.   Genitourinary: Negative for dysuria.   Musculoskeletal: Negative for myalgias.   Skin: Negative.    Neurological: Negative for dizziness, tingling, focal weakness and headaches.   Psychiatric/Behavioral: Negative for depression and suicidal ideas.       Past Medical History  Past Medical History:   Diagnosis Date   • MI (myocardial infarction) (HCC) 2018       Surgical History  Cardiac catheterization with stent placement    Family History  History reviewed. No pertinent family history.    Social History  Social History     Tobacco Use   • Smoking status: Never Smoker   • Smokeless tobacco: Never Used   Substance Use Topics   • Alcohol use: Yes     Frequency: 2-3 times a week   • Drug use: Not on file       Allergies  No Known Allergies    Medications  No current facility-administered medications on file prior to encounter.      Current Outpatient Medications on File Prior to Encounter   Medication Sig Dispense Refill   • carvedilol (COREG) 12.5 MG Tab      • lisinopril (PRINIVIL) 10 MG Tab      • amLODIPine (NORVASC) 10 MG Tab every day.         Physical Exam  Hemodynamics  Temp (24hrs), Av.6 °C (97.8 °F), Min:36.6 °C (97.8 °F), Max:36.6 °C (97.8 °F)   Temperature: 36.6 °C (97.8 °F)  Pulse  Av  Min: 75  Max: 77    Blood Pressure: (!) 199/123      Respiratory      Respiration: 16, Pulse Oximetry: 98 %             Physical Exam   Constitutional: He is oriented to person, place, and time. No distress.   HENT:   Head: Normocephalic and atraumatic.   Right Ear: External ear normal.   Left Ear: External ear normal.   Eyes: EOM are normal. Right eye exhibits no discharge. Left eye exhibits no discharge.   Neck: Neck supple. No JVD present.    Cardiovascular: Normal rate, regular rhythm and normal heart sounds.   Pulmonary/Chest: Effort normal and breath sounds normal. No respiratory distress. He exhibits no tenderness.   Abdominal: Soft. Bowel sounds are normal. He exhibits no distension. There is no abdominal tenderness.   Musculoskeletal:         General: No edema.   Neurological: He is alert and oriented to person, place, and time. No cranial nerve deficit.   Skin: Skin is dry. He is not diaphoretic. No erythema.   Psychiatric: He has a normal mood and affect. His behavior is normal.   Nursing note and vitals reviewed.    Capillary refill less than 3 seconds, distal pulses intact    Laboratory:  Recent Labs     05/22/20 1952   WBC 11.0*   RBC 5.08   HEMOGLOBIN 15.8   HEMATOCRIT 46.1   MCV 90.7   MCH 31.1   MCHC 34.3   RDW 41.3   PLATELETCT 284   MPV 9.3     Recent Labs     05/22/20 1952   SODIUM 141   POTASSIUM 4.0   CHLORIDE 104   CO2 23   GLUCOSE 101*   BUN 23*   CREATININE 1.24   CALCIUM 10.1     Recent Labs     05/22/20 1952   ALTSGPT 35   ASTSGOT 23   ALKPHOSPHAT 108*   TBILIRUBIN 0.5   LIPASE 24   GLUCOSE 101*     Recent Labs     05/22/20 1952   APTT 25.1   INR 0.91             No results found for: TROPONINI    Imaging  Dx-chest-portable (1 View)    Result Date: 5/22/2020 5/22/2020 8:23 PM HISTORY/REASON FOR EXAM:  Chest Pain. TECHNIQUE/EXAM DESCRIPTION AND NUMBER OF VIEWS: Single portable view of the chest. COMPARISON: None FINDINGS: Cardiomediastinal contour is mildly prominent. No focal pulmonary consolidation. No pleural fluid collection or pneumothorax. No major bony abnormality is seen.     1.  Mild cardiomegaly. 2.  No acute cardiopulmonary disease.        Assessment/Plan:  Anticipate that patient will need less than 2 midnights for management of the discussed medical issues.    * Pain in the chest  Assessment & Plan  Patient with reported history of prior MI with diagnosed coronary artery disease status post stent placement  although I do not have these records to confirm.  Additionally, he has uncontrolled blood pressure upon presentation in the emergency room.  He has been started on a heparin drip.  I will continue this and admit him to the telemetry floor for close monitoring with serial troponin levels and EKGs.  I will control his blood pressure.  I have started him on an aspirin and a statin and he will receive as needed morphine, oxygen, and nitroglycerin for chest pain.  I will check a lipid panel.  Dr. Moser of cardiology was consulted by the emergency room physician and recommends continued trending of cardiac enzymes and heparin drip.  If patient improves then we will plan to further risk stratify in the morning with a nuclear medicine stress test.  If cardiac enzymes worsen, then I defer to cardiology regarding need for cardiac catheterization.  Patient will be kept n.p.o. at midnight.    Malignant hypertension  Assessment & Plan  Resume home Norvasc, Coreg, and lisinopril with additional dose of lisinopril tonight.  Will add PRN IV antihypertensives and adjust home medications accordingly.      Prophylaxis: Patient will be on heparin, no additional need for DVT prophylaxis, no PPI indicated, bowel protocol as needed

## 2020-05-24 VITALS
TEMPERATURE: 97.3 F | WEIGHT: 217.59 LBS | RESPIRATION RATE: 18 BRPM | OXYGEN SATURATION: 95 % | BODY MASS INDEX: 30.46 KG/M2 | HEART RATE: 66 BPM | DIASTOLIC BLOOD PRESSURE: 75 MMHG | SYSTOLIC BLOOD PRESSURE: 135 MMHG | HEIGHT: 71 IN

## 2020-05-24 PROBLEM — R07.9 PAIN IN THE CHEST: Status: RESOLVED | Noted: 2020-05-22 | Resolved: 2020-05-24

## 2020-05-24 PROBLEM — I10 MALIGNANT HYPERTENSION: Status: RESOLVED | Noted: 2020-05-22 | Resolved: 2020-05-24

## 2020-05-24 LAB
ANION GAP SERPL CALC-SCNC: 12 MMOL/L (ref 7–16)
BUN SERPL-MCNC: 19 MG/DL (ref 8–22)
CALCIUM SERPL-MCNC: 9.3 MG/DL (ref 8.5–10.5)
CHLORIDE SERPL-SCNC: 102 MMOL/L (ref 96–112)
CO2 SERPL-SCNC: 20 MMOL/L (ref 20–33)
CREAT SERPL-MCNC: 1.27 MG/DL (ref 0.5–1.4)
EKG IMPRESSION: NORMAL
ERYTHROCYTE [DISTWIDTH] IN BLOOD BY AUTOMATED COUNT: 43.5 FL (ref 35.9–50)
GLUCOSE SERPL-MCNC: 98 MG/DL (ref 65–99)
HCT VFR BLD AUTO: 42.9 % (ref 42–52)
HGB BLD-MCNC: 14.7 G/DL (ref 14–18)
MCH RBC QN AUTO: 31.6 PG (ref 27–33)
MCHC RBC AUTO-ENTMCNC: 34.3 G/DL (ref 33.7–35.3)
MCV RBC AUTO: 92.3 FL (ref 81.4–97.8)
PLATELET # BLD AUTO: 205 K/UL (ref 164–446)
PMV BLD AUTO: 9.7 FL (ref 9–12.9)
POTASSIUM SERPL-SCNC: 3.7 MMOL/L (ref 3.6–5.5)
RBC # BLD AUTO: 4.65 M/UL (ref 4.7–6.1)
SODIUM SERPL-SCNC: 134 MMOL/L (ref 135–145)
WBC # BLD AUTO: 9.2 K/UL (ref 4.8–10.8)

## 2020-05-24 PROCEDURE — 700102 HCHG RX REV CODE 250 W/ 637 OVERRIDE(OP): Performed by: INTERNAL MEDICINE

## 2020-05-24 PROCEDURE — 85027 COMPLETE CBC AUTOMATED: CPT

## 2020-05-24 PROCEDURE — A9270 NON-COVERED ITEM OR SERVICE: HCPCS | Performed by: INTERNAL MEDICINE

## 2020-05-24 PROCEDURE — 700102 HCHG RX REV CODE 250 W/ 637 OVERRIDE(OP): Performed by: HOSPITALIST

## 2020-05-24 PROCEDURE — A9270 NON-COVERED ITEM OR SERVICE: HCPCS | Performed by: HOSPITALIST

## 2020-05-24 PROCEDURE — A9270 NON-COVERED ITEM OR SERVICE: HCPCS | Performed by: NURSE PRACTITIONER

## 2020-05-24 PROCEDURE — 700102 HCHG RX REV CODE 250 W/ 637 OVERRIDE(OP): Performed by: NURSE PRACTITIONER

## 2020-05-24 PROCEDURE — 80048 BASIC METABOLIC PNL TOTAL CA: CPT

## 2020-05-24 PROCEDURE — 97161 PT EVAL LOW COMPLEX 20 MIN: CPT

## 2020-05-24 PROCEDURE — 99232 SBSQ HOSP IP/OBS MODERATE 35: CPT | Performed by: INTERNAL MEDICINE

## 2020-05-24 PROCEDURE — 99239 HOSP IP/OBS DSCHRG MGMT >30: CPT | Performed by: HOSPITALIST

## 2020-05-24 PROCEDURE — 36415 COLL VENOUS BLD VENIPUNCTURE: CPT

## 2020-05-24 RX ORDER — CARVEDILOL 6.25 MG/1
6.25 TABLET ORAL 2 TIMES DAILY WITH MEALS
Status: DISCONTINUED | OUTPATIENT
Start: 2020-05-24 | End: 2020-05-24 | Stop reason: HOSPADM

## 2020-05-24 RX ADMIN — CLOPIDOGREL BISULFATE 600 MG: 300 TABLET, FILM COATED ORAL at 05:26

## 2020-05-24 RX ADMIN — LISINOPRIL 10 MG: 10 TABLET ORAL at 05:26

## 2020-05-24 RX ADMIN — AMLODIPINE BESYLATE 5 MG: 5 TABLET ORAL at 05:26

## 2020-05-24 RX ADMIN — DOCUSATE SODIUM 50 MG AND SENNOSIDES 8.6 MG 2 TABLET: 8.6; 5 TABLET, FILM COATED ORAL at 05:26

## 2020-05-24 RX ADMIN — ASPIRIN 81 MG: 81 TABLET, COATED ORAL at 05:26

## 2020-05-24 RX ADMIN — NITROGLYCERIN 1 INCH: 20 OINTMENT TOPICAL at 05:25

## 2020-05-24 RX ADMIN — CARVEDILOL 6.25 MG: 6.25 TABLET, FILM COATED ORAL at 08:40

## 2020-05-24 ASSESSMENT — ENCOUNTER SYMPTOMS
CHOKING: 0
STRIDOR: 0
SHORTNESS OF BREATH: 0
CHEST TIGHTNESS: 0
APNEA: 0
COUGH: 0
WHEEZING: 0

## 2020-05-24 ASSESSMENT — GAIT ASSESSMENTS
GAIT LEVEL OF ASSIST: SUPERVISED
DEVIATION: NO DEVIATION
DISTANCE (FEET): 500

## 2020-05-24 ASSESSMENT — COGNITIVE AND FUNCTIONAL STATUS - GENERAL
SUGGESTED CMS G CODE MODIFIER MOBILITY: CI
CLIMB 3 TO 5 STEPS WITH RAILING: A LITTLE
MOBILITY SCORE: 23

## 2020-05-24 NOTE — PROGRESS NOTES
Pt had 4 second pause slight MD SAFIA notified. CPAP applied. Will continue to monitor, no new orders at this time.

## 2020-05-24 NOTE — PROGRESS NOTES
Received Bedside report. Assumed care at 0715. This pt is AOx4, Pt is no acute distress, denies and pain/SOB at the moment. Labs noted, assessment complete Tele rhythm and monitor verified. Bed in lowest lock postion, 2 side rails up.Call light in place, fall precautions in place, patient educated on importance of calling for assistance. No additional needs at this time. VSS

## 2020-05-24 NOTE — THERAPY
"Physical Therapy   Initial Evaluation     Patient Name: Haider Crawford Jr.  Age:  44 y.o., Sex:  male  Medical Record #: [id number]  Today's Date: 5/24/2020     Precautions: Cardiac Precautions    Assessment  Patient is 44 y.o. male admitted with NSTEMI. Pt with hx of CAD and has had 4 stents placed. Educated and provided handout regarding physical activity following acute cardiac injury including activity monitoring with RPE scale and talk test, and activity progression with return home. Pt with good understanding. Pt reports no concerns with return home and appears functionally capable at this time.    Plan  Recommend Physical Therapy for Evaluation only.  Discharge recommendations: Anticipate that the patient will have no further physical therapy needs after discharge from the hospital.       05/24/20 0921   Prior Living Situation   Prior Services None   Housing / Facility 2 Story House   Steps Into Home 0   Steps In Home   (FOS but pt stays downstairs)   Equipment Owned None   Lives with - Patient's Self Care Capacity Significant Other   Comments reports SO and children close by could assist if needed   Prior Level of Functional Mobility   Bed Mobility Independent   Transfer Status Independent   Ambulation Independent   Distance Ambulation (Feet)   (community distances)   Assistive Devices Used None   Stairs Independent   Gait Analysis   Gait Level Of Assist Supervised   Assistive Device None   Distance (Feet) 500   Deviation No deviation   Weight Bearing Status no restrictions   Comments no LOB. did not exhibit fatigue or SOB and was able to engage in conversation with therapist throughout entirety of session. good understanding of RPE scale and \"talk test\" and how to progress activity with return home.    Functional Mobility   Sit to Stand Supervised   Anticipated Discharge Equipment   DC Equipment None       "

## 2020-05-24 NOTE — PROGRESS NOTES
RN walked patient to curbside pick-up; patient alert and oriented. All lines removed. Monitor room notified.

## 2020-05-24 NOTE — PROGRESS NOTES
Monitor Summary  SB/SR 49-66  AR 0.18 ; QRS 0.08 ; QT 0.48    Ectopy  HR down to 30s  4sa  Asymptomatic  2-2.8 sp (F)  (R) PAC/PVC, COUP  3 beats junct    MD aware

## 2020-05-24 NOTE — CARE PLAN
Problem: Communication  Goal: The ability to communicate needs accurately and effectively will improve  Outcome: PROGRESSING AS EXPECTED     Problem: Safety  Goal: Will remain free from injury  Outcome: PROGRESSING AS EXPECTED  Goal: Will remain free from falls  Outcome: PROGRESSING AS EXPECTED     Problem: Discharge Barriers/Planning  Goal: Patient's continuum of care needs will be met  Outcome: PROGRESSING AS EXPECTED     Problem: Respiratory:  Goal: Respiratory status will improve  Outcome: PROGRESSING AS EXPECTED     Problem: Pain Management  Goal: Pain level will decrease to patient's comfort goal  Outcome: PROGRESSING AS EXPECTED

## 2020-05-24 NOTE — PROGRESS NOTES
Cardiology Follow Up Progress Note    Date of Service  5/24/2020    Attending Physician  Bernardino Christianson M.D.    Chief Complaint   Chest pain    HPI  Haider Crawford Jr. is a 44 y.o. male admitted 5/22/2020 with NSTEMI.    Past medical history significant for coronary artery disease, prior PCI, follows with [facility] prior tobacco abuse,    Interim Events  5/24/2020 no overnight cardiac events, no arrhythmia, brief episode of bradycardia with heart rate down to 30s secondary to sleep apnea, this morning normal sinus rhythm, denies angina, no dyspnea, ambulated in the room, asymptomatic, we discussed the importance of taking dual antiplatelet therapy in the form of aspirin and Plavix, patient is in agreement, we discussed cardiac rehab.    Sodium 134  Potassium 3.7  Creatinine 1.27  Troponin peaked at 1547    Blood pressure 113/69  Rhythm sinus rhythm, brief episode of bradycardia while asleep    Review of Systems  Review of Systems   Respiratory: Negative for apnea, cough, choking, chest tightness, shortness of breath, wheezing and stridor.    Cardiovascular: Negative for chest pain and leg swelling.       Vital signs in last 24 hours  Temp:  [36.2 °C (97.2 °F)-37.1 °C (98.7 °F)] 36.7 °C (98 °F)  Pulse:  [47-69] 55  Resp:  [16-20] 18  BP: (106-138)/() 113/69  SpO2:  [94 %-100 %] 99 %    Physical Exam  Physical Exam  Cardiovascular:      Rate and Rhythm: Normal rate and regular rhythm.   Pulmonary:      Effort: Pulmonary effort is normal.   Abdominal:      General: Abdomen is flat.   Skin:     General: Skin is warm.      Comments: Right radial cath site without evidence of hematoma, excellent circulation   Neurological:      General: No focal deficit present.      Mental Status: He is alert.   Psychiatric:         Mood and Affect: Mood normal.         Lab Review  Lab Results   Component Value Date/Time    WBC 9.2 05/24/2020 04:08 AM    RBC 4.65 (L) 05/24/2020 04:08 AM    HEMOGLOBIN 14.7  05/24/2020 04:08 AM    HEMATOCRIT 42.9 05/24/2020 04:08 AM    MCV 92.3 05/24/2020 04:08 AM    MCH 31.6 05/24/2020 04:08 AM    MCHC 34.3 05/24/2020 04:08 AM    MPV 9.7 05/24/2020 04:08 AM      Lab Results   Component Value Date/Time    SODIUM 134 (L) 05/24/2020 04:08 AM    POTASSIUM 3.7 05/24/2020 04:08 AM    CHLORIDE 102 05/24/2020 04:08 AM    CO2 20 05/24/2020 04:08 AM    GLUCOSE 98 05/24/2020 04:08 AM    BUN 19 05/24/2020 04:08 AM    CREATININE 1.27 05/24/2020 04:08 AM      Lab Results   Component Value Date/Time    ASTSGOT 184 (H) 05/23/2020 11:49 AM    ALTSGPT 40 05/23/2020 11:49 AM     Lab Results   Component Value Date/Time    CHOLSTRLTOT 134 05/23/2020 12:07 AM    LDL 73 05/23/2020 12:07 AM    HDL 46 05/23/2020 12:07 AM    TRIGLYCERIDE 75 05/23/2020 12:07 AM    TROPONINT 1547 (H) 05/23/2020 11:49 AM       Recent Labs     05/22/20  1952   NTPROBNP 471*       Cardiac Imaging and Procedures Review  EKG: Sinus rhythm, biphasic T waves    Echocardiogram: LVEF 40%, no significant valvular abnormality      Cardiac Catheterization: 5/23/2020  Post-operative Diagnosis:   1. Normal LV systolic function with anterior wall hypokinesis  2. NSTEMI due to 99% proximal left anterior descending artery (LAD) stenosis at origin of 2 mm first diagonal branch which also had about 80-90% ostial stenosis with SHINE II flow into the LAD  3. 70-80% proximal left circumflex artery (LCX) stenosis  4. Patents multiple previously placed RCA stents  5. Status post stenting of proximal LAD with 3x15 mm Charlotte and mid LAD with 3x8 mm Charlotte drug eluting stent with no significant residual stenosis and SHINE III flow  6. Status post stenting of proximal LCX with 2.5x15 mm Albaro EVELINE with no significant residual stenosis and SHINE III         Imaging  Chest X-Ray:  Mild cardiomegaly.  2.  No acute cardiopulmonary disease.    Stress Test: Not applicable    Assessment/Plan    NSTEMI  -Underwent successful PCI/EVELINE to proximal LAD and PCI/EVELINE to proximal  "left circumflex, patent previously placed RCA stents.  -Continue with dual antiplatelet therapy in the form of aspirin and Plavix, along with Lipitor, carvedilol, lisinopril, amlodipine  - Brilinta none, not covered by insurance    Hypertension  -Well-controlled on current medical therapy, blood pressure 113/69  History of CAD/PCI to RCA  -Patent stents to RCA based on recent coronary angiography    Obstructive sleep apnea  -Previously using CPAP but recently discontinued secondary to \"breathing better\", we discussed the importance of starting back on CPAP and close outpatient follow-up with pulmonary group.    Stable for discharge from cardiology standpoint.  Patient wishes to follow-up with [facility] cardiology, will schedule follow-up appointment to be seen in 2 weeks.  Cardiac rehab    Please contact me with any questions.    RODNEY Guzman.   Cardiologist, [facility]  [phone]      "

## 2020-05-24 NOTE — CARE PLAN
Problem: Safety  Goal: Will remain free from falls  Outcome: PROGRESSING AS EXPECTED     Problem: Communication  Goal: The ability to communicate needs accurately and effectively will improve  Outcome: PROGRESSING AS EXPECTED     Problem: Bowel/Gastric:  Goal: Normal bowel function is maintained or improved  Outcome: PROGRESSING AS EXPECTED      Walk in

## 2020-05-24 NOTE — CARE PLAN
Problem: Communication  Goal: The ability to communicate needs accurately and effectively will improve  5/24/2020 1134 by Kiah Mallory R.N.  Outcome: MET  5/24/2020 0945 by Kiha Mallory R.N.  Outcome: PROGRESSING AS EXPECTED     Problem: Safety  Goal: Will remain free from injury  5/24/2020 1134 by Kiah Mallory R.N.  Outcome: MET  5/24/2020 0945 by Kiah Mallory R.N.  Outcome: PROGRESSING AS EXPECTED  Goal: Will remain free from falls  5/24/2020 1134 by Kiah Mallory R.N.  Outcome: MET  5/24/2020 0945 by Kiah Mallory R.N.  Outcome: PROGRESSING AS EXPECTED     Problem: Infection  Goal: Will remain free from infection  Outcome: MET     Problem: Bowel/Gastric:  Goal: Normal bowel function is maintained or improved  Outcome: MET  Goal: Will not experience complications related to bowel motility  Outcome: MET     Problem: Knowledge Deficit  Goal: Knowledge of disease process/condition, treatment plan, diagnostic tests, and medications will improve  Outcome: MET  Goal: Knowledge of the prescribed therapeutic regimen will improve  Outcome: MET     Problem: Discharge Barriers/Planning  Goal: Patient's continuum of care needs will be met  5/24/2020 1134 by Kiah Mallory R.N.  Outcome: MET  5/24/2020 0945 by Kiah Mallory R.N.  Outcome: PROGRESSING AS EXPECTED     Problem: Respiratory:  Goal: Respiratory status will improve  5/24/2020 1134 by Kiah Mallory R.N.  Outcome: MET  5/24/2020 0945 by Kiah Mallory R.N.  Outcome: PROGRESSING AS EXPECTED     Problem: Pain Management  Goal: Pain level will decrease to patient's comfort goal  5/24/2020 1134 by Kiah Mallory R.N.  Outcome: MET  5/24/2020 0945 by Kiah Mallory R.N.  Outcome: PROGRESSING AS EXPECTED

## 2020-05-24 NOTE — PROGRESS NOTES
Pts heart rate sustaining in the 30's, pt asymptomatic. MD Monsivais notified, orders to hold Coreg at this time.

## 2020-05-24 NOTE — DISCHARGE INSTRUCTIONS
Discharge Instructions    Discharged to home by car with relative. Discharged via walking, hospital escort: Refused.  Special equipment needed: Not Applicable    Be sure to schedule a follow-up appointment with your primary care doctor or any specialists as instructed.     Discharge Plan:   Diet Plan: Discussed  Activity Level: Discussed  Confirmed Follow up Appointment: Appointment Scheduled  Confirmed Symptoms Management: Discussed  Medication Reconciliation Updated: Yes    I understand that a diet low in cholesterol, fat, and sodium is recommended for good health. Unless I have been given specific instructions below for another diet, I accept this instruction as my diet prescription.   Other diet: Heart Healthy    Special Instructions: Diagnosis:  Acute Coronary Syndrome (ACS) is a diagnosis that encompasses cardiac-related chest pain and heart attack. ACS occurs when the blood flow to the heart muscle is severely reduced or cut off completely due to a slow process called atherosclerosis.  Atherosclerosis is a disease in which the coronary arteries become narrow from a buildup of fat, cholesterol, and other substances that combine to form plaque. If the plaque breaks, a blood clot will form and block the blood flow to the heart muscle. This lack of blood flow can cause damage or death to the heart muscle which is called a heart attack or Myocardial Infarction (MI). There are two different types of MIs:  ST Elevation Myocardial Infarction or STEMI (the most severe type of heart attack) and Non-ST Elevation Myocardial Infarction or NSTEMI.    Treatment Plan:  · Cardiac Diet  - Low fat, low salt, low cholesterol   · Cardiac Rehab  - Your doctor has ordered you a referral to [facility].  Call [phone] to schedule an appointment.  · Attend my follow-up appointment with my Cardiologist.  · Take my medications as prescribed by my doctor  · Exercise daily  · Quit Smoking, Lower my bad cholesterol and raise my good  cholesterol, lower my blood pressure and Reduce stress    Medications:  Certain medications are used to treat ACS.  Remember to always take medications as prescribed and never stop talking medications unless told by your doctor.    You have been prescribed the following medicatons:    Aspirin - Aspirin is used as a blood thinning medication and you will require this medication indefinitely.  Anti-platelet/blood thinner - Your Anti-platelet/Blood thinning medication is called Plavix, and is used in combination with aspirin to prevent clots from forming in your heart and/or around your stent.  Your doctor will determine how long you need to be on this medicine.  Beta-Blocker - Beta-Blocker Coreg is used to lower blood pressure and heart rate, and/or helps your heart heal after a heart attack.  Statin - Statin Atorvastatin is used to lower cholesterol.  Angiotensin Converting Enzyme (ACE) Inhibitor - Angiotensin Converting Enzyme Inhibitor Lisinopril is used to lower blood pressure and treat heart failure.    · Is patient discharged on Warfarin / Coumadin?   No     Depression / Suicide Risk    As you are discharged from this [facility] facility, it is important to learn how to keep safe from harming yourself.    Recognize the warning signs:  · Abrupt changes in personality, positive or negative- including increase in energy   · Giving away possessions  · Change in eating patterns- significant weight changes-  positive or negative  · Change in sleeping patterns- unable to sleep or sleeping all the time   · Unwillingness or inability to communicate  · Depression  · Unusual sadness, discouragement and loneliness  · Talk of wanting to die  · Neglect of personal appearance   · Rebelliousness- reckless behavior  · Withdrawal from people/activities they love  · Confusion- inability to concentrate     If you or a loved one observes any of these behaviors or has concerns about self-harm, here's what you can do:  · Talk about  it- your feelings and reasons for harming yourself  · Remove any means that you might use to hurt yourself (examples: pills, rope, extension cords, firearm)  · Get professional help from the community (Mental Health, Substance Abuse, psychological counseling)  · Do not be alone:Call your Safe Contact- someone whom you trust who will be there for you.  · Call your local CRISIS HOTLINE 287-2033 or 827-376-5973  · Call your local Children's Mobile Crisis Response Team [place] (082) 404-2054 or wwwEarDish  · Call the toll free National Suicide Prevention Hotlines   · National Suicide Prevention Lifeline 003-554-YFZB (5076)  · [employer] 800-SUICIDE (174-1853)      Coronary Angiogram With Stent  Coronary angiogram with stent placement is a procedure to widen or open a narrow blood vessel of the heart (coronary artery). Arteries may become blocked by cholesterol buildup (plaques) in the lining or wall. When a coronary artery becomes partially blocked, blood flow to that area decreases. This may lead to chest pain or a heart attack (myocardial infarction).  A stent is a small piece of metal that looks like mesh or a spring. Stent placement may be done as treatment for a heart attack or right after a coronary angiogram in which a blocked artery is found.  Let your health care provider know about:  · Any allergies you have.  · All medicines you are taking, including vitamins, herbs, eye drops, creams, and over-the-counter medicines.  · Any problems you or family members have had with anesthetic medicines.  · Any blood disorders you have.  · Any surgeries you have had.  · Any medical conditions you have.  · Whether you are pregnant or may be pregnant.  What are the risks?  Generally, this is a safe procedure. However, problems may occur, including:  · Damage to the heart or its blood vessels.  · A return of blockage.  · Bleeding, infection, or bruising at the insertion site.  · A collection of  blood under the skin (hematoma) at the insertion site.  · A blood clot in another part of the body.  · Kidney injury.  · Allergic reaction to the dye or contrast that is used.  · Bleeding into the abdomen (retroperitoneal bleeding).  What happens before the procedure?  Staying hydrated   Follow instructions from your health care provider about hydration, which may include:  · Up to 2 hours before the procedure - you may continue to drink clear liquids, such as water, clear fruit juice, black coffee, and plain tea.  Eating and drinking restrictions   Follow instructions from your health care provider about eating and drinking, which may include:  · 8 hours before the procedure - stop eating heavy meals or foods such as meat, fried foods, or fatty foods.  · 6 hours before the procedure - stop eating light meals or foods, such as toast or cereal.  · 2 hours before the procedure - stop drinking clear liquids.  Ask your health care provider about:  · Changing or stopping your regular medicines. This is especially important if you are taking diabetes medicines or blood thinners.  · Taking medicines such as ibuprofen. These medicines can thin your blood. Do not take these medicines before your procedure if your health care provider instructs you not to. Generally, aspirin is recommended before a procedure of passing a small, thin tube (catheter) through a blood vessel and into the heart (cardiac catheterization).  What happens during the procedure?  · An IV tube will be inserted into one of your veins.  · You will be given one or more of the following:  ¨ A medicine to help you relax (sedative).  ¨ A medicine to numb the area where the catheter will be inserted into an artery (local anesthetic).  · To reduce your risk of infection:  ¨ Your health care team will wash or sanitize their hands.  ¨ Your skin will be washed with soap.  ¨ Hair may be removed from the area where the catheter will be inserted.  · Using a guide  wire, the catheter will be inserted into an artery. The location may be in your groin, in your wrist, or in the fold of your arm (near your elbow).  · A type of X-ray (fluoroscopy) will be used to help guide the catheter to the opening of the arteries in the heart.  · A dye will be injected into the catheter, and X-rays will be taken. The dye will help to show where any narrowing or blockages are located in the arteries.  · A tiny wire will be guided to the blocked spot, and a balloon will be inflated to make the artery wider.  · The stent will be expanded and will crush the plaques into the wall of the vessel. The stent will hold the area open and improve the blood flow. Most stents have a drug coating to reduce the risk of the stent narrowing over time.  · The artery may be made wider using a drill, laser, or other tools to remove plaques.  · When the blood flow is better, the catheter will be removed. The lining of the artery will grow over the stent, which stays where it was placed.  This procedure may vary among health care providers and hospitals.  What happens after the procedure?  · If the procedure is done through the leg, you will be kept in bed lying flat for about 6 hours. You will be instructed to not bend and not cross your legs.  · The insertion site will be checked frequently.  · The pulse in your foot or wrist will be checked frequently.  · You may have additional blood tests, X-rays, and a test that records the electrical activity of your heart (electrocardiogram, or ECG).  This information is not intended to replace advice given to you by your health care provider. Make sure you discuss any questions you have with your health care provider.  Document Released: 06/23/2004 Document Revised: 08/17/2017 Document Reviewed: 07/23/2017  Elsevier Interactive Patient Education © 2017 [employer]

## 2020-05-24 NOTE — PROGRESS NOTES
Monitor Summary  Sinus Augie, Sinus Rhythm 58-84  Rare PVC; accelerated junctional w/ BBB x 3beats; 3.2 sp, 1.9 sp, 4 sa  .16/.08/.42

## 2020-05-25 NOTE — DISCHARGE SUMMARY
Discharge Summary    CHIEF COMPLAINT ON ADMISSION  Chief Complaint   Patient presents with   • Chest Pressure     began 15 mins ago, radiates to L arm. Hx MI years ago, with 4 stents placed. + diaphoresis. denies SOB, N/V       Reason for Admission  Chest Pain     Admission Date  5/22/2020    CODE STATUS  Prior    HPI & HOSPITAL COURSE  This is a 44 y.o. male here with chest pain. PMX CAD with prior cardiac stent. He was hospitalized and referred to cardiology as his troponin was abnormally elevated. He went for coronary  angiogram. See results below. He was started on asa, plavix, BB and statin. He was stable for dc on5/25       Therefore, he is discharged in good and stable condition to home with close outpatient follow-up.    The patient recovered much more quickly than anticipated on admission.    Discharge Date  5/24/2020    FOLLOW UP ITEMS POST DISCHARGE      DISCHARGE DIAGNOSES  Principal Problem:    NSTEMI (non-ST elevated myocardial infarction) (HCC) POA: Yes  Active Problems:    Coronary artery disease due to lipid rich plaque - post PCI POA: Yes    Presence of stent in right coronary artery - CTH (Chronic) POA: Yes    Status post insertion of drug-eluting stent into left anterior descending (LAD) artery (Chronic) POA: No    Presence of drug-eluting stent in left circumflex coronary artery (Chronic) POA: No  Resolved Problems:    Pain in the chest POA: Unknown    Malignant hypertension POA: Yes      FOLLOW UP  No future appointments.  KIMBERLY GuzmanPRUTH  [address]  [address]  [place] [address]  [phone]    Schedule an appointment as soon as possible for a visit in 1 week  [facility] will call by Tuesday 5/28 to schedule an appt. If you do not hear from them please call the number above to set up appt.      MEDICATIONS ON DISCHARGE     Medication List      START taking these medications      Instructions   clopidogrel 75 MG Tabs  Commonly known as:  PLAVIX   Take 1 Tab by mouth every  day.  Dose:  75 mg        CONTINUE taking these medications      Instructions   amLODIPine 10 MG Tabs  Commonly known as:  NORVASC   every day.     aspirin 81 MG Chew chewable tablet  Commonly known as:  ASA   Take 32 mg by mouth every day.  Dose:  32 mg     atorvastatin 20 MG Tabs  Commonly known as:  LIPITOR   Take 40 mg by mouth every day. Indications: High Amount of Fats in the Blood  Dose:  40 mg     carvedilol 12.5 MG Tabs  Commonly known as:  COREG      lisinopril 10 MG Tabs  Commonly known as:  PRINIVIL             Allergies  No Known Allergies    DIET  No orders of the defined types were placed in this encounter.      ACTIVITY  As tolerated.  Weight bearing as tolerated    CONSULTATIONS  Dr gay cards    PROCEDURES  Cardiac catheterization report     Procedure date: 5/23/2020     Referring physician: Dr. Humberto Moser     Pre-operative Diagnosis:  1. NSTEMI  2. History of RCA stents     Post-operative Diagnosis:   1. Normal LV systolic function with anterior wall hypokinesis  2. NSTEMI due to 99% proximal left anterior descending artery (LAD) stenosis at origin of 2 mm first diagonal branch which also had about 80-90% ostial stenosis with SHINE II flow into the LAD  3. 70-80% proximal left circumflex artery (LCX) stenosis  4. Patents multiple previously placed RCA stents  5. Status post stenting of proximal LAD with 3x15 mm Albaro and mid LAD with 3x8 mm [place] drug eluting stent with no significant residual stenosis and SHINE III flow  6. Status post stenting of proximal LCX with 2.5x15 mm [place] EVELINE with no significant residual stenosis and SHINE III     Procedure:     Supervision of moderate conscious sedation     Complications: None     Description of Procedure:  After informed consent was obtained, the patient was brought to cardiac catheterization laboratory in fasting state.   Sourav test was carried out on the right hand and was found to be negative.  Right wrist and right groin were then prepped  and drapped in sterile fashion.  Versed and fentanyl were used for conscious sedation.  Lidocaine 2% was used to anesthetize the area.  A 6 Spanish Terumo sheath was then placed in the right radial artery using Seldinger technique.  A 2.5 mg of verapamil, 100 microgram of nitroglycerine and 3000 units of heparin were administered into the radial sheath.     Selective angiography of the right and left coronary artery were performed in multiple views using 5 Spanish TIG catheter.   The TIG catheter was used to enter the left ventricle.    Left ventricular pressure was recorded.   Left ventriculography was performed using 16 cc of contrast injected over 2 seconds.      After reviewing of the diagnostic angiography, it was felt that intervention of the LAD artery was indicated.  Bivalrudin was then started for anticoagulation.  A 6 Spanish EBU 3.5 guide catheter was used.  A 0.014 BMW wire was then advanced pass the stenosis into distal LAD and Whisper wire was advanced into first diagonal branch (D1).  The LAD lesion was dilated with a 2x12 mm balloon.  The ostial D1 was then dilated with the same balloon  A 3x15 mm [place] drug eluting stent was deployed.  The Whisper wire was withdrawn and recrossed back into D1.  The ostial D1 was then re-dilated with 2 mm balloon  Subsequent angiography showed no significant residual stenosis with SHINE III flow.      We then decided to proceed with PCI of the lCX.  The BMW wire was subsequently directed into distal OM.  The lesion was dilated with 2 mm balloon.  A 2.5x15b mm [place] drug eluting stent was placed and post dilated with 2.5x12 mm non compliant balloon.  Final angiography confirmed good result.   The radial sheath was subsequently removed.  Hemostasis was obtained using a Terumo TR wrist band.  The patient was then given a loading dose of Brillinta.  Bivalirudin was subsequently reduced to low dose infusion.  The patient tolerated procedure well and left cardiac catheterization  laboratory in stable condition.     I supervised monitoring the patient under moderate conscious sedation beginning at 9:44AM until the end of the case at 10:45AM.     Findings:     There is no gradient across aortic valve.  Left ventricular end-diastolic pressure was 11 mmHg.     Left ventriculography showed hypokinesis of the anterior wall.  Overall LV systolic function is mildly reduced .  Ejection fraction is calculated to be 40%.     Two vessel coronary artery disease     This is right dominant system.     Left main is large and without flow limiting disease.  It bifurcated into left anterior descending and left circumflex artery.      Left anterior descending artery is large caliber vessel and extends to the apex.  It gives rises to several small to medium sized diagonal branches.  At the beginning of the case, there was 99% stenosis in the proximal portion of the left anterior descending artery (LAD) at the origin of first diagonal branch.  There was also 80-90% stenosis at the fist diagonal branch ostium.  There is no signficant disease in the left anterior descending artery or its major branches.  The antegrade flow was sluggish at SHINE II.     Left circumflex artery is medium in caliber.   It gives rise to one distal obtuse marginal branch.  There was 70-80% stenosis in the proximal portion of the left circumflex artery (LCX).  The antegrade flow is normal.     Right coronary artery (RCA) is large caliber. It gives rise to several small acute marginal branches, posterior descending artery and posterolateral branch.  Multiple stents were noted in the mid RCA.They were widely patent.  There is no significant disease in the right coronary artery or its major branches.  The antegrade flow is normal.     After intervention, there was 0% residual stenosis in LAD or LCX with SHINE III flow.        Plans;  Dual antiplatelet therapy for one year.   Continue low-dose bivalirudin infusion for 4 hours.   Risk factor  modification.  Limit right wrist movement for 24 hours.           Ayla Ramirez M.D.               LABORATORY  Lab Results   Component Value Date    SODIUM 134 (L) 05/24/2020    POTASSIUM 3.7 05/24/2020    CHLORIDE 102 05/24/2020    CO2 20 05/24/2020    GLUCOSE 98 05/24/2020    BUN 19 05/24/2020    CREATININE 1.27 05/24/2020        Lab Results   Component Value Date    WBC 9.2 05/24/2020    HEMOGLOBIN 14.7 05/24/2020    HEMATOCRIT 42.9 05/24/2020    PLATELETCT 205 05/24/2020        Total time of the discharge process exceeds 40 minutes.

## 2020-05-29 ENCOUNTER — TELEPHONE (OUTPATIENT)
Dept: CARDIOLOGY | Facility: MEDICAL CENTER | Age: 45
End: 2020-05-29

## 2020-05-29 NOTE — TELEPHONE ENCOUNTER
MONA/cherry    Pt calling to ask if he must do his hospital follow up prior to getting a return to work note issued.  Pt has appt 6/3 for hospital follow up.    Please call Haider to advise

## 2020-05-29 NOTE — TELEPHONE ENCOUNTER
"Returned pt call and reviewed findings.  He states, \"It's so I can let my boss know.  Just wanted to double check. I'm feeling pretty good, first couple days it took getting use to.  No issues with breathing, not feeling anything really.\"  Offered request to be relayed to MD, but noted we will not receive an answer until Monday as it is already 1654.  He states he will go ahead and keep his hospital [facility] with APN and request for letter at that time.  He states no other concerns or questions at this time and is appreciative of information given.  "

## 2020-06-03 ENCOUNTER — OFFICE VISIT (OUTPATIENT)
Dept: CARDIOLOGY | Facility: PHYSICIAN GROUP | Age: 45
End: 2020-06-03
Payer: COMMERCIAL

## 2020-06-03 VITALS
SYSTOLIC BLOOD PRESSURE: 140 MMHG | WEIGHT: 219 LBS | HEART RATE: 64 BPM | HEIGHT: 71 IN | DIASTOLIC BLOOD PRESSURE: 98 MMHG | OXYGEN SATURATION: 95 % | BODY MASS INDEX: 30.66 KG/M2

## 2020-06-03 DIAGNOSIS — Z95.5 STATUS POST INSERTION OF DRUG-ELUTING STENT INTO LEFT ANTERIOR DESCENDING (LAD) ARTERY: Chronic | ICD-10-CM

## 2020-06-03 DIAGNOSIS — I25.83 CORONARY ARTERY DISEASE DUE TO LIPID RICH PLAQUE: ICD-10-CM

## 2020-06-03 DIAGNOSIS — I10 ESSENTIAL HYPERTENSION, BENIGN: ICD-10-CM

## 2020-06-03 DIAGNOSIS — E78.2 MIXED HYPERLIPIDEMIA: ICD-10-CM

## 2020-06-03 DIAGNOSIS — I21.4 NSTEMI (NON-ST ELEVATED MYOCARDIAL INFARCTION) (HCC): ICD-10-CM

## 2020-06-03 DIAGNOSIS — Z95.5 PRESENCE OF DRUG-ELUTING STENT IN LEFT CIRCUMFLEX CORONARY ARTERY: Chronic | ICD-10-CM

## 2020-06-03 DIAGNOSIS — I25.10 CORONARY ARTERY DISEASE DUE TO LIPID RICH PLAQUE: ICD-10-CM

## 2020-06-03 PROBLEM — E78.5 HYPERLIPIDEMIA: Status: ACTIVE | Noted: 2020-06-03

## 2020-06-03 PROCEDURE — 99214 OFFICE O/P EST MOD 30 MIN: CPT | Performed by: NURSE PRACTITIONER

## 2020-06-03 RX ORDER — LISINOPRIL 20 MG/1
20 TABLET ORAL DAILY
Qty: 90 TAB | Refills: 1 | Status: SHIPPED | OUTPATIENT
Start: 2020-06-03 | End: 2020-09-09 | Stop reason: SDUPTHER

## 2020-06-03 RX ORDER — CLOPIDOGREL BISULFATE 75 MG/1
75 TABLET ORAL DAILY
Qty: 90 TAB | Refills: 3 | Status: SHIPPED | OUTPATIENT
Start: 2020-06-03 | End: 2021-03-10 | Stop reason: SDUPTHER

## 2020-06-03 RX ORDER — ATORVASTATIN CALCIUM 40 MG/1
40 TABLET, FILM COATED ORAL DAILY
Qty: 90 TAB | Refills: 1 | Status: SHIPPED | OUTPATIENT
Start: 2020-06-03 | End: 2020-10-19 | Stop reason: SDUPTHER

## 2020-06-03 RX ORDER — AMLODIPINE BESYLATE 5 MG/1
5 TABLET ORAL DAILY
COMMUNITY
Start: 2020-05-24 | End: 2020-06-03 | Stop reason: SDUPTHER

## 2020-06-03 RX ORDER — LISINOPRIL 20 MG/1
20 TABLET ORAL DAILY
COMMUNITY
Start: 2020-05-24 | End: 2020-06-03 | Stop reason: SDUPTHER

## 2020-06-03 RX ORDER — CARVEDILOL 25 MG/1
25 TABLET ORAL 2 TIMES DAILY WITH MEALS
Qty: 180 TAB | Refills: 1 | Status: SHIPPED | OUTPATIENT
Start: 2020-06-03 | End: 2021-03-10 | Stop reason: SDUPTHER

## 2020-06-03 RX ORDER — AMLODIPINE BESYLATE 5 MG/1
5 TABLET ORAL DAILY
Qty: 90 TAB | Refills: 1 | Status: SHIPPED | OUTPATIENT
Start: 2020-06-03 | End: 2021-03-10 | Stop reason: SDUPTHER

## 2020-06-03 RX ORDER — ATORVASTATIN CALCIUM 40 MG/1
40 TABLET, FILM COATED ORAL DAILY
COMMUNITY
Start: 2020-05-24 | End: 2020-06-03 | Stop reason: SDUPTHER

## 2020-06-03 ASSESSMENT — ENCOUNTER SYMPTOMS
SHORTNESS OF BREATH: 0
MYALGIAS: 0
NAUSEA: 0
DIZZINESS: 0
HEADACHES: 0
CHILLS: 0
PALPITATIONS: 0
FEVER: 0
ABDOMINAL PAIN: 0
COUGH: 0
BRUISES/BLEEDS EASILY: 0
PND: 0
LOSS OF CONSCIOUSNESS: 0
INSOMNIA: 0
ORTHOPNEA: 0

## 2020-06-03 ASSESSMENT — FIBROSIS 4 INDEX: FIB4 SCORE: 6.53

## 2020-06-03 NOTE — LETTER
Nell 3, 2020      RE:  Haider Atwoodespinoza Arellano ( 1975)  [address] [address]        To Whom It May Concern:    Haider is under our care for management of cardiac issues.    Haider is able to return to work without any restrictions on Monday, 2020.    If you have any questions or concerns, please don't hesitate to call.        Sincerely,        KIMBERLY MusaP.ELVIN.    Electronically Signed

## 2020-06-03 NOTE — PROGRESS NOTES
Chief Complaint   Patient presents with   • Hospital Follow-up   • Coronary Artery Disease   • HTN (Uncontrolled)   • Hyperlipidemia       Subjective:   Haider Crawford Jr. is a 44 y.o. male who presents today for hospital follow-up of NSTEMI.    Haider is a 44 year old male with history of CAD, status post PCI/stents to the RCA in 2018 by Dr. Medrano at [facility]. About 6-9 months ago, he lost his job, and health insurance, and couldn't refill his medications.    On 5/22/2020, he was transferred to [facility] from outside facility with chest pain, and elevated troponins. Coronary angiogram revealed patent RCA stents, but stenosis of the LAD and LCx, which were treated with three drug eluting stents. Medical was restarted with addition of ASA, Plavix, BB, ACEI, CCB and statin.    He is here today for follow-up. He denies any further chest pain, pressure or discomfort; no palpitations; no shortness of breath, orthopnea or PND; no dizziness or syncope; no LE edema. BP has been running 140-160 systolic at home. He does not smoke; rarely drinks alcohol, and does not do drugs. He sleeps fine.    Past Medical History:   Diagnosis Date   • Anginal syndrome ([facility])    • At risk for sleep apnea    • Back pain    • Coronary artery disease due to lipid rich plaque - post PCI    • Hyperlipidemia    • Hypertension    • MI (myocardial infarction) ([facility]) 2018    PCI/EVELINE to the RCA ([facility])   • NSTEMI (non-ST elevated myocardial infarction) ([facility]) 05/2020    PCI/EVELINE x 2 to the LAD, and PCI/EVELINE to the LCx   • Presence of drug-eluting stent in left circumflex coronary artery    • Presence of stent in right coronary artery - [facility]    • Psychiatric problem     Anxiety   • Status post insertion of drug-eluting stent into left anterior descending (LAD) artery 5/23/2020   • Stented coronary artery      Past Surgical History:   Procedure Laterality Date   • OTHER      vasectomy   • OTHER CARDIAC SURGERY       Family History    Problem Relation Age of Onset   • Heart Disease Neg Hx      Social History     Socioeconomic History   • Marital status:      Spouse name: Not on file   • Number of children: Not on file   • Years of education: Not on file   • Highest education level: Not on file   Occupational History   • Not on file   Social Needs   • Financial resource strain: Not on file   • Food insecurity     Worry: Patient refused     Inability: Patient refused   • Transportation needs     Medical: Patient refused     Non-medical: Patient refused   Tobacco Use   • Smoking status: Former Smoker   • Smokeless tobacco: Never Used   Substance and Sexual Activity   • Alcohol use: Yes     Frequency: 2-4 times a month     Drinks per session: 5 or 6     Binge frequency: Less than monthly   • Drug use: Never   • Sexual activity: Not on file   Lifestyle   • Physical activity     Days per week: Not on file     Minutes per session: Not on file   • Stress: Not on file   Relationships   • Social connections     Talks on phone: Not on file     Gets together: Not on file     Attends Christianity service: Not on file     Active member of club or organization: Not on file     Attends meetings of clubs or organizations: Not on file     Relationship status: Not on file   • Intimate partner violence     Fear of current or ex partner: Not on file     Emotionally abused: Not on file     Physically abused: Not on file     Forced sexual activity: Not on file   Other Topics Concern   • Not on file   Social History Narrative   • Not on file     No Known Allergies  Outpatient Encounter Medications as of 6/3/2020   Medication Sig Dispense Refill   • lisinopril (PRINIVIL) 20 MG Tab Take 1 Tab by mouth every day. 90 Tab 1   • atorvastatin (LIPITOR) 40 MG Tab Take 1 Tab by mouth every day. 90 Tab 1   • amLODIPine (NORVASC) 5 MG Tab Take 1 Tab by mouth every day. 90 Tab 1   • clopidogrel (PLAVIX) 75 MG Tab Take 1 Tab by mouth every day. 90 Tab 3   • carvedilol (COREG)  "25 MG Tab Take 1 Tab by mouth 2 times a day, with meals. 180 Tab 1   • aspirin (ASA) 81 MG Chew Tab chewable tablet Take 32 mg by mouth every day.     • [DISCONTINUED] amLODIPine (NORVASC) 5 MG Tab Take 5 mg by mouth every day.     • [DISCONTINUED] atorvastatin (LIPITOR) 40 MG Tab Take 40 mg by mouth every day.     • [DISCONTINUED] lisinopril (PRINIVIL) 20 MG Tab Take 20 mg by mouth every day.     • [DISCONTINUED] clopidogrel (PLAVIX) 75 MG Tab Take 1 Tab by mouth every day. 30 Tab 11   • [DISCONTINUED] carvedilol (COREG) 12.5 MG Tab Take 12.5 mg by mouth 2 times a day, with meals.     • [DISCONTINUED] lisinopril (PRINIVIL) 10 MG Tab      • [DISCONTINUED] amLODIPine (NORVASC) 10 MG Tab Take 10 mg by mouth every day.     • [DISCONTINUED] atorvastatin (LIPITOR) 20 MG Tab Take 40 mg by mouth every day. Indications: High Amount of Fats in the Blood       No facility-administered encounter medications on file as of 6/3/2020.      Review of Systems   Constitutional: Negative for chills and fever.   HENT: Negative for congestion.    Respiratory: Negative for cough and shortness of breath.    Cardiovascular: Negative for chest pain, palpitations, orthopnea, leg swelling and PND.   Gastrointestinal: Negative for abdominal pain and nausea.   Musculoskeletal: Negative for myalgias.   Skin: Negative for rash.   Neurological: Negative for dizziness, loss of consciousness and headaches.   Endo/Heme/Allergies: Does not bruise/bleed easily.   Psychiatric/Behavioral: The patient does not have insomnia.         Objective:   /98 (BP Location: Right arm, Patient Position: Sitting)   Pulse 64   Ht 1.803 m (5' 11\")   Wt 99.3 kg (219 lb)   SpO2 95%   BMI 30.54 kg/m²     Physical Exam   Constitutional: He is oriented to person, place, and time. He appears well-developed and well-nourished.   BMI 30.54   HENT:   Head: Normocephalic.   Eyes: EOM are normal.   Neck: Normal range of motion. Neck supple. No JVD present. "   Cardiovascular: Normal rate, regular rhythm and normal heart sounds.   Right radial catheter incision site is well healed; no redness or swelling.   Pulmonary/Chest: Effort normal and breath sounds normal. No respiratory distress. He has no wheezes. He has no rales.   Abdominal: Soft. Bowel sounds are normal. He exhibits no distension. There is no abdominal tenderness.   Musculoskeletal: Normal range of motion.         General: No edema.   Neurological: He is alert and oriented to person, place, and time.   Skin: Skin is warm and dry. No rash noted.   Psychiatric: He has a normal mood and affect.     Coronary angiogram procedure date: 5/23/2020  Pre-operative Diagnosis:  1. NSTEMI  2. History of RCA stents  Post-operative Diagnosis:   1. Normal LV systolic function with anterior wall hypokinesis  2. NSTEMI due to 99% proximal left anterior descending artery (LAD) stenosis at origin of 2 mm first diagonal branch which also had about 80-90% ostial stenosis with SHINE II flow into the LAD  3. 70-80% proximal left circumflex artery (LCX) stenosis  4. Patents multiple previously placed RCA stents  5. Status post stenting of proximal LAD with 3x15 mm [place] and mid LAD with 3x8 mm Albaro drug eluting stent with no significant residual stenosis and SHINE III flow  6. Status post stenting of proximal LCX with 2.5x15 mm Albaro EVELINE with no significant residual stenosis and SHINE III     CONCLUSIONS OF ECHOCARDIOGRAM OF 5/23/2020:  No prior study is available for comparison.   LAD regional wall motion abnormality.  Left ventricular ejection fraction is visually estimated to be 40%.     Lab Results   Component Value Date/Time    CHOLSTRLTOT 134 05/23/2020 12:07 AM    LDL 73 05/23/2020 12:07 AM    HDL 46 05/23/2020 12:07 AM    TRIGLYCERIDE 75 05/23/2020 12:07 AM       Lab Results   Component Value Date/Time    SODIUM 134 (L) 05/24/2020 04:08 AM    POTASSIUM 3.7 05/24/2020 04:08 AM    CHLORIDE 102 05/24/2020 04:08 AM    CO2 20  05/24/2020 04:08 AM    GLUCOSE 98 05/24/2020 04:08 AM    BUN 19 05/24/2020 04:08 AM    CREATININE 1.27 05/24/2020 04:08 AM     Lab Results   Component Value Date/Time    ALKPHOSPHAT 102 (H) 05/23/2020 11:49 AM    ASTSGOT 184 (H) 05/23/2020 11:49 AM    ALTSGPT 40 05/23/2020 11:49 AM    TBILIRUBIN 0.7 05/23/2020 11:49 AM      Assessment:     1. NSTEMI (non-ST elevated myocardial infarction) (HCC)     2. Coronary artery disease due to lipid rich plaque - post PCI  lisinopril (PRINIVIL) 20 MG Tab    atorvastatin (LIPITOR) 40 MG Tab    clopidogrel (PLAVIX) 75 MG Tab    EC-ECHOCARDIOGRAM COMPLETE W/O CONT   3. Status post insertion of drug-eluting stent into left anterior descending (LAD) artery  clopidogrel (PLAVIX) 75 MG Tab    EC-ECHOCARDIOGRAM COMPLETE W/O CONT   4. Presence of drug-eluting stent in left circumflex coronary artery     5. Mixed hyperlipidemia  Comp Metabolic Panel    Lipid Profile   6. Essential hypertension, benign  amLODIPine (NORVASC) 5 MG Tab    carvedilol (COREG) 25 MG Tab       Medical Decision Making:  Today's Assessment / Status / Plan:     1. Recent hospitalization for NSTEMI, with PCI/EVELINE x 2 to the LAD and PCI/EVELINE to the LCx; previous RCA stents patent, LVEF 40%. He remains on ASA, Plavix, BB, ACEI and statin. Did offer cardiac rehab, and he does have a FU phone call with them. He is instructed that he will most likely be on Plavix ongoing/for life.    2. Hyperlipidemia, treated with Lipitor 40mg.    3. Hypertension, treated, but elevated. To increase Coreg to 25mg BID (from 12.5mg BID); will also potentially help with LVEF. Monitor BP at home.    Plan as above: increase Coreg. 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 To FU in 3 months for repeat echo, as well as FU of BP. If BP is consistently >150/90, he is to notify us via [facility] for medication adjustment.    Collaborating MD: Jose

## 2020-09-09 ENCOUNTER — OFFICE VISIT (OUTPATIENT)
Dept: CARDIOLOGY | Facility: PHYSICIAN GROUP | Age: 45
End: 2020-09-09
Payer: COMMERCIAL

## 2020-09-09 VITALS
WEIGHT: 225 LBS | HEART RATE: 62 BPM | DIASTOLIC BLOOD PRESSURE: 100 MMHG | SYSTOLIC BLOOD PRESSURE: 150 MMHG | BODY MASS INDEX: 31.5 KG/M2 | HEIGHT: 71 IN | OXYGEN SATURATION: 96 %

## 2020-09-09 DIAGNOSIS — Z95.5 PRESENCE OF STENT IN RIGHT CORONARY ARTERY: Chronic | ICD-10-CM

## 2020-09-09 DIAGNOSIS — Z95.5 STATUS POST INSERTION OF DRUG-ELUTING STENT INTO LEFT ANTERIOR DESCENDING (LAD) ARTERY: Chronic | ICD-10-CM

## 2020-09-09 DIAGNOSIS — I10 ESSENTIAL HYPERTENSION, BENIGN: ICD-10-CM

## 2020-09-09 DIAGNOSIS — E78.2 MIXED HYPERLIPIDEMIA: ICD-10-CM

## 2020-09-09 DIAGNOSIS — I25.83 CORONARY ARTERY DISEASE DUE TO LIPID RICH PLAQUE: ICD-10-CM

## 2020-09-09 DIAGNOSIS — I25.10 CORONARY ARTERY DISEASE DUE TO LIPID RICH PLAQUE: ICD-10-CM

## 2020-09-09 DIAGNOSIS — Z95.5 PRESENCE OF DRUG-ELUTING STENT IN LEFT CIRCUMFLEX CORONARY ARTERY: Chronic | ICD-10-CM

## 2020-09-09 PROCEDURE — 99214 OFFICE O/P EST MOD 30 MIN: CPT | Performed by: NURSE PRACTITIONER

## 2020-09-09 RX ORDER — LISINOPRIL 20 MG/1
20 TABLET ORAL 2 TIMES DAILY
Qty: 180 TAB | Refills: 1 | Status: SHIPPED | OUTPATIENT
Start: 2020-09-09 | End: 2021-03-10 | Stop reason: SDUPTHER

## 2020-09-09 ASSESSMENT — ENCOUNTER SYMPTOMS
LOSS OF CONSCIOUSNESS: 0
PND: 0
SHORTNESS OF BREATH: 0
DIZZINESS: 0
BRUISES/BLEEDS EASILY: 0
INSOMNIA: 0
CHILLS: 0
COUGH: 0
ORTHOPNEA: 0
FEVER: 0
PALPITATIONS: 0
ABDOMINAL PAIN: 0
NAUSEA: 0
MYALGIAS: 0
HEADACHES: 0

## 2020-09-09 ASSESSMENT — FIBROSIS 4 INDEX: FIB4 SCORE: 6.67

## 2020-09-09 NOTE — PROGRESS NOTES
Chief Complaint   Patient presents with   • Follow-Up   • HTN (Controlled)   • Coronary Artery Disease   • Hyperlipidemia       Subjective:   Haider Crawford Jr. is a 45 y.o. male who presents today for three month follow-up, to recheck BP.    Haider is a 45 year old male with history of CAD, status post PCI/stents to the RCA in 2018 by Dr. Medrano at [facility]. In January 2020, he lost his job, and health insurance, and couldn't refill his medications. In May 2020, he was transferred to [facility] from outside facility with chest pain, and elevated troponins. Coronary angiogram revealed patent RCA stents, but stenosis of the LAD and LCx, which were treated with three drug eluting stents. Medical was restarted with addition of ASA, Plavix, BB, ACEI, CCB and statin.     He is here today for three month follow-up. BP has been on the higher side, running 135-160 systolic. Interestingly, he notices that BP went up when he had to start wearing a mask at work full time. He denies any chest pain, pressure or discomfort; no palpitations; no shortness of breath, orthopnea or PND; no dizziness or syncope; no LE edema.      Past Medical History:   Diagnosis Date   • Anginal syndrome ([facility])    • At risk for sleep apnea    • Back pain    • Coronary artery disease due to lipid rich plaque - post PCI    • Hyperlipidemia    • Hypertension 09/2020    Echocardiogram with normal LV size, moderate concentric LVH, VLEF 60%. Mildly dilated RV. Trace MR, trace TR.   • MI (myocardial infarction) ([facility]) 2018    PCI/EVELINE to the RCA ([facility])   • NSTEMI (non-ST elevated myocardial infarction) ([facility]) 05/2020    PCI/EVELINE x 2 to the LAD, and PCI/EVELINE to the LCx   • Presence of drug-eluting stent in left circumflex coronary artery    • Presence of stent in right coronary artery - [facility]    • Psychiatric problem     Anxiety   • Status post insertion of drug-eluting stent into left anterior descending (LAD) artery 5/23/2020   • Stented coronary  artery      Past Surgical History:   Procedure Laterality Date   • OTHER      vasectomy   • OTHER CARDIAC SURGERY       Family History   Problem Relation Age of Onset   • Heart Disease Neg Hx      Social History     Socioeconomic History   • Marital status:      Spouse name: Not on file   • Number of children: Not on file   • Years of education: Not on file   • Highest education level: Not on file   Occupational History   • Not on file   Social Needs   • Financial resource strain: Not on file   • Food insecurity     Worry: Patient refused     Inability: Patient refused   • Transportation needs     Medical: Patient refused     Non-medical: Patient refused   Tobacco Use   • Smoking status: Former Smoker   • Smokeless tobacco: Never Used   Substance and Sexual Activity   • Alcohol use: Yes     Frequency: 2-4 times a month     Drinks per session: 5 or 6     Binge frequency: Less than monthly   • Drug use: Never   • Sexual activity: Not on file   Lifestyle   • Physical activity     Days per week: Not on file     Minutes per session: Not on file   • Stress: Not on file   Relationships   • Social connections     Talks on phone: Not on file     Gets together: Not on file     Attends [employer] service: Not on file     Active member of club or organization: Not on file     Attends meetings of clubs or organizations: Not on file     Relationship status: Not on file   • Intimate partner violence     Fear of current or ex partner: Not on file     Emotionally abused: Not on file     Physically abused: Not on file     Forced sexual activity: Not on file   Other Topics Concern   • Not on file   Social History Narrative   • Not on file     No Known Allergies  Outpatient Encounter Medications as of 9/9/2020   Medication Sig Dispense Refill   • lisinopril (PRINIVIL) 20 MG Tab Take 1 Tab by mouth 2 times a day. 180 Tab 1   • atorvastatin (LIPITOR) 40 MG Tab Take 1 Tab by mouth every day. 90 Tab 1   • amLODIPine (NORVASC) 5 MG  "Tab Take 1 Tab by mouth every day. 90 Tab 1   • clopidogrel (PLAVIX) 75 MG Tab Take 1 Tab by mouth every day. 90 Tab 3   • carvedilol (COREG) 25 MG Tab Take 1 Tab by mouth 2 times a day, with meals. 180 Tab 1   • aspirin (ASA) 81 MG Chew Tab chewable tablet Take 32 mg by mouth every day.     • [DISCONTINUED] lisinopril (PRINIVIL) 20 MG Tab Take 1 Tab by mouth every day. 90 Tab 1     No facility-administered encounter medications on file as of 9/9/2020.      Review of Systems   Constitutional: Negative for chills and fever.   HENT: Negative for congestion.    Respiratory: Negative for cough and shortness of breath.    Cardiovascular: Negative for chest pain, palpitations, orthopnea, leg swelling and PND.   Gastrointestinal: Negative for abdominal pain and nausea.   Musculoskeletal: Negative for myalgias.   Skin: Negative for rash.   Neurological: Negative for dizziness, loss of consciousness and headaches.   Endo/Heme/Allergies: Does not bruise/bleed easily.   Psychiatric/Behavioral: The patient does not have insomnia.         Objective:   /100 (BP Location: Right arm, Patient Position: Sitting)   Pulse 62   Ht 1.803 m (5' 11\")   Wt 102.1 kg (225 lb)   SpO2 96%   BMI 31.38 kg/m²     Physical Exam   Constitutional: He is oriented to person, place, and time. He appears well-developed and well-nourished.   BMI 31.38 (weight is up slightly)   HENT:   Head: Normocephalic.   Eyes: EOM are normal.   Neck: Normal range of motion. Neck supple. No JVD present.   Cardiovascular: Normal rate, regular rhythm and normal heart sounds.   Right radial catheter incision site is well healed; no redness or swelling.   Pulmonary/Chest: Effort normal and breath sounds normal. No respiratory distress. He has no wheezes. He has no rales.   Abdominal: Soft. Bowel sounds are normal. He exhibits no distension. There is no abdominal tenderness.   Musculoskeletal: Normal range of motion.         General: No edema.   Neurological: He " is alert and oriented to person, place, and time.   Skin: Skin is warm and dry. No rash noted.   Psychiatric: He has a normal mood and affect.     CONCLUSIONS OF ECHOCARDIOGRAM OF 9/2/2020:  Normal left ventricular systolic function.  Left ventricular ejection fraction is visually estimated to be 60%.  Normal diastolic function.  Normal right ventricular systolic function.  No significant valve disease or flow abnormalities.     No recent labwork done.    Assessment:     1. Essential hypertension, benign  lisinopril (PRINIVIL) 20 MG Tab   2. Coronary artery disease due to lipid rich plaque - post PCI  lisinopril (PRINIVIL) 20 MG Tab   3. Presence of drug-eluting stent in left circumflex coronary artery     4. Presence of stent in right coronary artery - [facility]     5. Status post insertion of drug-eluting stent into left anterior descending (LAD) artery     6. Mixed hyperlipidemia         Medical Decision Making:  Today's Assessment / Status / Plan:     1. Hypertension, treated, but still elevated. Repeat BP by me today was 142/100. To increase Lisinopril to 20mg BID. Continue Coreg 25mg BID and Norvasc 5mg once daily; may need to titrate Norvasc.    2. CAD, status post previous interventions in 2018, and more recently in May 2020. On ASA, Plavix, BB, ACEI and statin. Echocardiogram showed improved LVEF (from 40% to 60%). Continue medical therapy.    3. Hyperlipidemia, treated with Lipitor. He is given reprinted lab orders for CMP and lipid panel.    Plan as above. [facility] in 2-3 weeks; may need future in office appointment. Otherwise, FU in 3-6 months.

## 2020-10-07 LAB
ALBUMIN SERPL-MCNC: 4.4 G/DL (ref 4–5)
ALBUMIN/GLOB SERPL: 1.8 {RATIO} (ref 1.2–2.2)
ALP SERPL-CCNC: 83 IU/L (ref 39–117)
ALT SERPL-CCNC: 19 IU/L (ref 0–44)
AST SERPL-CCNC: 17 IU/L (ref 0–40)
BILIRUB SERPL-MCNC: 0.4 MG/DL (ref 0–1.2)
BUN SERPL-MCNC: 24 MG/DL (ref 6–24)
BUN/CREAT SERPL: 21 (ref 9–20)
CALCIUM SERPL-MCNC: 9.6 MG/DL (ref 8.7–10.2)
CHLORIDE SERPL-SCNC: 108 MMOL/L (ref 96–106)
CHOLEST SERPL-MCNC: 149 MG/DL (ref 100–199)
CO2 SERPL-SCNC: 21 MMOL/L (ref 20–29)
CREAT SERPL-MCNC: 1.16 MG/DL (ref 0.76–1.27)
GLOBULIN SER CALC-MCNC: 2.5 G/DL (ref 1.5–4.5)
GLUCOSE SERPL-MCNC: 92 MG/DL (ref 65–99)
HDLC SERPL-MCNC: 37 MG/DL
LABORATORY COMMENT REPORT: ABNORMAL
LDLC SERPL CALC-MCNC: 92 MG/DL (ref 0–99)
POTASSIUM SERPL-SCNC: 4.8 MMOL/L (ref 3.5–5.2)
PROT SERPL-MCNC: 6.9 G/DL (ref 6–8.5)
SODIUM SERPL-SCNC: 142 MMOL/L (ref 134–144)
TRIGL SERPL-MCNC: 111 MG/DL (ref 0–149)
VLDLC SERPL CALC-MCNC: 20 MG/DL (ref 5–40)

## 2020-10-13 ENCOUNTER — TELEPHONE (OUTPATIENT)
Dept: CARDIOLOGY | Facility: MEDICAL CENTER | Age: 45
End: 2020-10-13

## 2020-10-13 DIAGNOSIS — E78.5 HYPERLIPIDEMIA, UNSPECIFIED HYPERLIPIDEMIA TYPE: ICD-10-CM

## 2020-10-13 NOTE — TELEPHONE ENCOUNTER
----- Message from MERCEDES Musa sent at 10/12/2020  2:14 PM PDT -----  LDL goal is <70 (and he is at 92).  Could either increase Lipitor 80mg or stay at Lipitor 40mg and add Zetia 10mg.  Ok to send Rx to pharmacy for either option.  Repeat CMP and lipid panel in 1 month.  Thanks, AB

## 2020-10-19 DIAGNOSIS — I25.10 CORONARY ARTERY DISEASE DUE TO LIPID RICH PLAQUE: ICD-10-CM

## 2020-10-19 DIAGNOSIS — E78.5 HYPERLIPIDEMIA, UNSPECIFIED HYPERLIPIDEMIA TYPE: ICD-10-CM

## 2020-10-19 DIAGNOSIS — Z95.5 PRESENCE OF STENT IN RIGHT CORONARY ARTERY: ICD-10-CM

## 2020-10-19 DIAGNOSIS — I25.83 CORONARY ARTERY DISEASE DUE TO LIPID RICH PLAQUE: ICD-10-CM

## 2020-10-19 RX ORDER — ATORVASTATIN CALCIUM 80 MG/1
80 TABLET, FILM COATED ORAL DAILY
Qty: 90 TAB | Refills: 3 | Status: SHIPPED | OUTPATIENT
Start: 2020-10-19 | End: 2021-03-10 | Stop reason: SDUPTHER

## 2021-03-05 ENCOUNTER — TELEPHONE (OUTPATIENT)
Dept: CARDIOLOGY | Facility: MEDICAL CENTER | Age: 46
End: 2021-03-05

## 2021-03-05 NOTE — TELEPHONE ENCOUNTER
Called Patient in regards to standing blood work from 10/19/2020. No answer, left v/m to try to get them done before his appointment with Anita Muñoz on 03/10/21 @9:00am. Stated he would need to fast 8-10 hours prior to getting it done.

## 2021-03-10 ENCOUNTER — TELEMEDICINE (OUTPATIENT)
Dept: CARDIOLOGY | Facility: PHYSICIAN GROUP | Age: 46
End: 2021-03-10
Payer: COMMERCIAL

## 2021-03-10 VITALS — HEIGHT: 71 IN | BODY MASS INDEX: 30.8 KG/M2 | WEIGHT: 220 LBS

## 2021-03-10 DIAGNOSIS — I25.10 CORONARY ARTERY DISEASE DUE TO LIPID RICH PLAQUE: ICD-10-CM

## 2021-03-10 DIAGNOSIS — E78.5 HYPERLIPIDEMIA, UNSPECIFIED HYPERLIPIDEMIA TYPE: ICD-10-CM

## 2021-03-10 DIAGNOSIS — Z95.5 PRESENCE OF DRUG-ELUTING STENT IN LEFT CIRCUMFLEX CORONARY ARTERY: Chronic | ICD-10-CM

## 2021-03-10 DIAGNOSIS — Z95.5 STATUS POST INSERTION OF DRUG-ELUTING STENT INTO LEFT ANTERIOR DESCENDING (LAD) ARTERY: Chronic | ICD-10-CM

## 2021-03-10 DIAGNOSIS — I10 ESSENTIAL HYPERTENSION, BENIGN: ICD-10-CM

## 2021-03-10 DIAGNOSIS — I25.83 CORONARY ARTERY DISEASE DUE TO LIPID RICH PLAQUE: ICD-10-CM

## 2021-03-10 PROCEDURE — 99214 OFFICE O/P EST MOD 30 MIN: CPT | Mod: 95,CR | Performed by: NURSE PRACTITIONER

## 2021-03-10 RX ORDER — LISINOPRIL 20 MG/1
20 TABLET ORAL 2 TIMES DAILY
Qty: 200 TABLET | Refills: 3 | Status: SHIPPED | OUTPATIENT
Start: 2021-03-10 | End: 2022-12-23

## 2021-03-10 RX ORDER — ATORVASTATIN CALCIUM 80 MG/1
80 TABLET, FILM COATED ORAL DAILY
Qty: 100 TABLET | Refills: 3 | Status: SHIPPED | OUTPATIENT
Start: 2021-03-10 | End: 2021-12-28

## 2021-03-10 RX ORDER — AMLODIPINE BESYLATE 10 MG/1
10 TABLET ORAL DAILY
Qty: 100 TABLET | Refills: 3 | Status: SHIPPED | OUTPATIENT
Start: 2021-03-10 | End: 2022-03-24

## 2021-03-10 RX ORDER — CARVEDILOL 25 MG/1
25 TABLET ORAL 2 TIMES DAILY WITH MEALS
Qty: 200 TABLET | Refills: 3 | Status: SHIPPED | OUTPATIENT
Start: 2021-03-10 | End: 2022-03-24

## 2021-03-10 RX ORDER — CLOPIDOGREL BISULFATE 75 MG/1
75 TABLET ORAL DAILY
Qty: 100 TABLET | Refills: 3 | Status: SHIPPED | OUTPATIENT
Start: 2021-03-10 | End: 2022-03-24

## 2021-03-10 ASSESSMENT — ENCOUNTER SYMPTOMS
FEVER: 0
DIZZINESS: 0
MYALGIAS: 0
ORTHOPNEA: 0
CHILLS: 0
HEADACHES: 0
INSOMNIA: 0
LOSS OF CONSCIOUSNESS: 0
BRUISES/BLEEDS EASILY: 0
ABDOMINAL PAIN: 0
PALPITATIONS: 0
COUGH: 0
PND: 0
SHORTNESS OF BREATH: 0
NAUSEA: 0

## 2021-03-10 ASSESSMENT — FIBROSIS 4 INDEX: FIB4 SCORE: 0.89

## 2021-03-10 NOTE — PROGRESS NOTES
Telemedicine: Established Patient   This evaluation was conducted via Zoom using secure and encrypted videoconferencing technology. The patient was in a private location in the state of [place].    The patient's identity was confirmed and verbal consent was obtained for this virtual visit.    Subjective:   CC:   Chief Complaint   Patient presents with   • Follow-Up   • Coronary Artery Disease   • HTN (Controlled)   • Hyperlipidemia       Haider Crawford Jr. is a 45 y.o. male presenting for six month follow-up of CAD, hypertension and hyperlipidemia.    Haider is a 45 year old male with history of CAD, status post PCI/stents to the RCA in 2018 by Dr. Medrano at [facility].     In January 2020, he lost his job, and health insurance, and couldn't refill his medications. In May 2020, he was hospitalized at [facility] for NSTEMI. Coronary angiogram revealed patent RCA stents, but stenosis of the LAD and LCx, which were treated with three drug eluting stents. Medical was restarted with addition of ASA, Plavix, BB, ACEI, CCB and statin.     He is here today for six month follow-up via TeleMedicine. BP has been running 140-160 systolic. He denies any chest pain, pressure or discomfort; no palpitations; no shortness of breath, orthopnea or PND; no dizziness or syncope; no LE edema. No headache or visual changes. He does remain active at work. He is not smoking. He is taking his medications consistently.       Review of Systems   Constitutional: Negative for chills and fever.   HENT: Negative for congestion.    Respiratory: Negative for cough and shortness of breath.    Cardiovascular: Negative for chest pain, palpitations, orthopnea, leg swelling and PND.   Gastrointestinal: Negative for abdominal pain and nausea.   Musculoskeletal: Negative for myalgias.   Skin: Negative for rash.   Neurological: Negative for dizziness, loss of consciousness and headaches.   Endo/Heme/Allergies: Does not bruise/bleed easily.    Psychiatric/Behavioral: The patient does not have insomnia.          No Known Allergies    Current medicines (including changes today)  Current Outpatient Medications   Medication Sig Dispense Refill   • amLODIPine (NORVASC) 10 MG Tab Take 1 tablet by mouth every day. 100 tablet 3   • lisinopril (PRINIVIL) 20 MG Tab Take 1 tablet by mouth 2 times a day. 200 tablet 3   • carvedilol (COREG) 25 MG Tab Take 1 tablet by mouth 2 times a day, with meals. 200 tablet 3   • clopidogrel (PLAVIX) 75 MG Tab Take 1 tablet by mouth every day. 100 tablet 3   • atorvastatin (LIPITOR) 80 MG tablet Take 1 tablet by mouth every day. 100 tablet 3   • aspirin (ASA) 81 MG Chew Tab chewable tablet Take 32 mg by mouth every day.       No current facility-administered medications for this visit.       Patient Active Problem List    Diagnosis Date Noted   • Hyperlipidemia 06/03/2020     Priority: High   • Essential hypertension, benign 06/03/2020     Priority: High   • Status post insertion of drug-eluting stent into left anterior descending (LAD) artery 05/23/2020     Priority: High   • Coronary artery disease due to lipid rich plaque - post PCI      Priority: High   • Presence of stent in right coronary artery - [facility]      Priority: High   • Presence of drug-eluting stent in left circumflex coronary artery      Priority: High   • NSTEMI (non-ST elevated myocardial infarction) ([facility]) 05/22/2020     Priority: High       Family History   Problem Relation Age of Onset   • Heart Disease Neg Hx        He  has a past medical history of Anginal syndrome ([facility]), At risk for sleep apnea, Back pain, Coronary artery disease due to lipid rich plaque - post PCI, Hyperlipidemia, Hypertension (09/2020), MI (myocardial infarction) ([facility]) (2018), NSTEMI (non-ST elevated myocardial infarction) ([facility]) (05/2020), Presence of drug-eluting stent in left circumflex coronary artery, Presence of stent in right coronary artery - [facility], Psychiatric problem, Status post  "insertion of drug-eluting stent into left anterior descending (LAD) artery (5/23/2020), and Stented coronary artery. He also has no past medical history of Arrhythmia, Arthritis, Asthma, Blood clotting disorder ([facility]), Bronchitis, Cancer ([facility]), Cataract, Congestive heart failure (HCC), COPD (chronic obstructive pulmonary disease) ([facility]), Diabetes ([facility]), Dialysis patient ([facility]), Disorder of thyroid, Fall, Glaucoma, Gynecological disorder, Heart murmur, Heart valve disease, Hemorrhagic disorder ([facility]), Indigestion, Infectious disease, Jaundice, Pacemaker, Pneumonia, Renal disorder, Rheumatic fever, Seizure (HCC), Stroke ([facility]), or Urinary incontinence.  He  has a past surgical history that includes other and other cardiac surgery.       Objective:   Ht 1.803 m (5' 11\")   Wt 99.8 kg (220 lb)   BMI 30.68 kg/m²     Physical Exam   Constitutional: He is oriented to person, place, and time and well-developed, well-nourished, and in no distress.   HENT:   Head: Normocephalic.   Pulmonary/Chest: Effort normal.   Musculoskeletal:         General: Normal range of motion.      Cervical back: Normal range of motion.   Neurological: He is alert and oriented to person, place, and time.   Psychiatric: Mood, affect and judgment normal.     No recent labwork done.    CONCLUSIONS OF ECHOCARDIOGRAM OF 9/2/2020:  Normal left ventricular systolic function.  Left ventricular ejection fraction is visually estimated to be 60%.  Normal diastolic function.  Normal right ventricular systolic function.  No significant valve disease or flow abnormalities.      CORONARY ANGIOGRAM OF 5/23/2020:  1. Normal LV systolic function with anterior wall hypokinesis  2. NSTEMI due to 99% proximal left anterior descending artery (LAD) stenosis at origin of 2 mm first diagonal branch which also had about 80-90% ostial stenosis with SHINE II flow into the LAD  3. 70-80% proximal left circumflex artery (LCX) stenosis  4. Patents multiple previously placed RCA " stents  5. Status post stenting of proximal LAD with 3x15 mm Albaro and mid LAD with 3x8 mm Albaro drug eluting stent with no significant residual stenosis and SHINE III flow  6. Status post stenting of proximal LCX with 2.5x15 mm [place] EVELINE with no significant residual stenosis and SHINE III    Assessment and Plan:   The following treatment plan was discussed:     1. Coronary artery disease due to lipid rich plaque - post PCI  - lisinopril (PRINIVIL) 20 MG Tab; Take 1 tablet by mouth 2 times a day.  Dispense: 200 tablet; Refill: 3  - clopidogrel (PLAVIX) 75 MG Tab; Take 1 tablet by mouth every day.  Dispense: 100 tablet; Refill: 3  - atorvastatin (LIPITOR) 80 MG tablet; Take 1 tablet by mouth every day.  Dispense: 100 tablet; Refill: 3  2. Presence of drug-eluting stent in left circumflex coronary artery  3. Status post insertion of drug-eluting stent into left anterior descending (LAD) artery  - clopidogrel (PLAVIX) 75 MG Tab; Take 1 tablet by mouth every day.  Dispense: 100 tablet; Refill: 3    He remains on ASA, Plavix, BB, ACEI, statin and CCB. No angina or shortness of breath. He is reminded to get labs done.    4. Essential hypertension, benign  - amLODIPine (NORVASC) 10 MG Tab; Take 1 tablet by mouth every day.  Dispense: 100 tablet; Refill: 3  - lisinopril (PRINIVIL) 20 MG Tab; Take 1 tablet by mouth 2 times a day.  Dispense: 200 tablet; Refill: 3  - carvedilol (COREG) 25 MG Tab; Take 1 tablet by mouth 2 times a day, with meals.  Dispense: 200 tablet; Refill: 3  - CBC WITH DIFFERENTIAL; Future  Treated with Coreg and Lisinopril. To increase Amlodipine from 5mg to 10mg once daily. Check BP at home.    5. Hyperlipidemia, unspecified hyperlipidemia type  - atorvastatin (LIPITOR) 80 MG tablet; Take 1 tablet by mouth every day.  Dispense: 100 tablet; Refill: 3  - Comp Metabolic Panel; Future  - Lipid Profile; Future  Treated with Lipitor. To check fasting CMP and lipid panel.    Same medications, except increase  Amlodipine from 5mg to 10mg. FU in 6 months, sooner if clinical condition changes.        Follow-up: No follow-ups on file.

## 2021-09-15 ENCOUNTER — TELEPHONE (OUTPATIENT)
Dept: CARDIOLOGY | Facility: PHYSICIAN GROUP | Age: 46
End: 2021-09-15

## 2021-09-20 ENCOUNTER — TELEPHONE (OUTPATIENT)
Dept: CARDIOLOGY | Facility: MEDICAL CENTER | Age: 46
End: 2021-09-20

## 2021-10-27 ENCOUNTER — TELEPHONE (OUTPATIENT)
Dept: CARDIOLOGY | Facility: MEDICAL CENTER | Age: 46
End: 2021-10-27

## 2021-10-28 NOTE — TELEPHONE ENCOUNTER
Letter drafted and faxed to [phone]  Confirmation status received  Scan to [facility]

## 2021-12-27 DIAGNOSIS — E78.5 HYPERLIPIDEMIA, UNSPECIFIED HYPERLIPIDEMIA TYPE: ICD-10-CM

## 2021-12-27 DIAGNOSIS — I25.83 CORONARY ARTERY DISEASE DUE TO LIPID RICH PLAQUE: ICD-10-CM

## 2021-12-27 DIAGNOSIS — I25.10 CORONARY ARTERY DISEASE DUE TO LIPID RICH PLAQUE: ICD-10-CM

## 2021-12-30 RX ORDER — ATORVASTATIN CALCIUM 80 MG/1
80 TABLET, FILM COATED ORAL DAILY
Qty: 90 TABLET | Refills: 0 | Status: SHIPPED | OUTPATIENT
Start: 2021-12-30 | End: 2023-01-10

## 2022-03-24 DIAGNOSIS — I10 ESSENTIAL HYPERTENSION, BENIGN: ICD-10-CM

## 2022-03-24 DIAGNOSIS — I25.83 CORONARY ARTERY DISEASE DUE TO LIPID RICH PLAQUE: ICD-10-CM

## 2022-03-24 DIAGNOSIS — I25.10 CORONARY ARTERY DISEASE DUE TO LIPID RICH PLAQUE: ICD-10-CM

## 2022-03-24 DIAGNOSIS — Z95.5 STATUS POST INSERTION OF DRUG-ELUTING STENT INTO LEFT ANTERIOR DESCENDING (LAD) ARTERY: Chronic | ICD-10-CM

## 2022-03-25 RX ORDER — CARVEDILOL 25 MG/1
TABLET ORAL
Qty: 180 TABLET | Refills: 0 | Status: SHIPPED | OUTPATIENT
Start: 2022-03-25 | End: 2022-08-22

## 2022-03-25 RX ORDER — AMLODIPINE BESYLATE 10 MG/1
TABLET ORAL
Qty: 90 TABLET | Refills: 0 | Status: SHIPPED | OUTPATIENT
Start: 2022-03-25 | End: 2022-08-22

## 2022-03-25 RX ORDER — CLOPIDOGREL BISULFATE 75 MG/1
TABLET ORAL
Qty: 90 TABLET | Refills: 0 | Status: SHIPPED | OUTPATIENT
Start: 2022-03-25 | End: 2022-08-22

## 2022-08-20 DIAGNOSIS — I10 ESSENTIAL HYPERTENSION, BENIGN: ICD-10-CM

## 2022-08-20 DIAGNOSIS — Z95.5 STATUS POST INSERTION OF DRUG-ELUTING STENT INTO LEFT ANTERIOR DESCENDING (LAD) ARTERY: Chronic | ICD-10-CM

## 2022-08-20 DIAGNOSIS — I25.10 CORONARY ARTERY DISEASE DUE TO LIPID RICH PLAQUE: ICD-10-CM

## 2022-08-20 DIAGNOSIS — I25.83 CORONARY ARTERY DISEASE DUE TO LIPID RICH PLAQUE: ICD-10-CM

## 2022-08-22 RX ORDER — AMLODIPINE BESYLATE 10 MG/1
TABLET ORAL
Qty: 30 TABLET | Refills: 0 | Status: SHIPPED | OUTPATIENT
Start: 2022-08-22 | End: 2022-11-15

## 2022-08-22 RX ORDER — CLOPIDOGREL BISULFATE 75 MG/1
TABLET ORAL
Qty: 30 TABLET | Refills: 0 | Status: SHIPPED | OUTPATIENT
Start: 2022-08-22 | End: 2022-11-15

## 2022-08-22 RX ORDER — CARVEDILOL 25 MG/1
TABLET ORAL
Qty: 60 TABLET | Refills: 0 | Status: SHIPPED | OUTPATIENT
Start: 2022-08-22 | End: 2022-11-15

## 2022-08-22 NOTE — TELEPHONE ENCOUNTER
Is the patient due for a refill? Yes    Was the patient seen the past year? No    Date of last office visit: 3/10/21    Does the patient have an upcoming appointment?  No   If yes, When? none    Provider to refill:AB    Does the patients insurance require a 100 day supply?  No    Courtesy fill was sent last 3/25/22. Please advice thanks.

## 2022-11-09 DIAGNOSIS — I10 ESSENTIAL HYPERTENSION, BENIGN: ICD-10-CM

## 2022-11-10 NOTE — TELEPHONE ENCOUNTER
Is the patient due for a refill? Yes    Was the patient seen the past year? No    Date of last office visit: 3/10/2021    Does the patient have an upcoming appointment?  Yes   If yes, When? 1/18/2023    Provider to refill:AB    Does the patients insurance require a 100 day supply?  No

## 2022-11-13 DIAGNOSIS — I25.83 CORONARY ARTERY DISEASE DUE TO LIPID RICH PLAQUE: ICD-10-CM

## 2022-11-13 DIAGNOSIS — Z95.5 STATUS POST INSERTION OF DRUG-ELUTING STENT INTO LEFT ANTERIOR DESCENDING (LAD) ARTERY: Chronic | ICD-10-CM

## 2022-11-13 DIAGNOSIS — I25.10 CORONARY ARTERY DISEASE DUE TO LIPID RICH PLAQUE: ICD-10-CM

## 2022-11-14 NOTE — TELEPHONE ENCOUNTER
Is the patient due for a refill? Yes    Was the patient seen the past year? No    Date of last office visit: 3/10/21    Does the patient have an upcoming appointment?  Yes   If yes, When? 1/18/23    Provider to refill:AB    Does the patients insurance require a 100 day supply?  No

## 2022-11-15 RX ORDER — CLOPIDOGREL BISULFATE 75 MG/1
TABLET ORAL
Qty: 30 TABLET | Refills: 1 | Status: SHIPPED | OUTPATIENT
Start: 2022-11-15 | End: 2023-01-18 | Stop reason: SDUPTHER

## 2022-11-15 RX ORDER — AMLODIPINE BESYLATE 10 MG/1
TABLET ORAL
Qty: 90 TABLET | Refills: 0 | Status: SHIPPED | OUTPATIENT
Start: 2022-11-15 | End: 2023-01-18 | Stop reason: SDUPTHER

## 2022-11-15 RX ORDER — CARVEDILOL 25 MG/1
TABLET ORAL
Qty: 90 TABLET | Refills: 0 | Status: SHIPPED | OUTPATIENT
Start: 2022-11-15 | End: 2023-01-18 | Stop reason: SDUPTHER

## 2022-12-21 DIAGNOSIS — I25.83 CORONARY ARTERY DISEASE DUE TO LIPID RICH PLAQUE: ICD-10-CM

## 2022-12-21 DIAGNOSIS — I10 ESSENTIAL HYPERTENSION, BENIGN: ICD-10-CM

## 2022-12-21 DIAGNOSIS — I25.10 CORONARY ARTERY DISEASE DUE TO LIPID RICH PLAQUE: ICD-10-CM

## 2022-12-23 RX ORDER — LISINOPRIL 20 MG/1
TABLET ORAL
Qty: 180 TABLET | Refills: 0 | Status: SHIPPED | OUTPATIENT
Start: 2022-12-23 | End: 2023-01-18 | Stop reason: SDUPTHER

## 2023-01-08 DIAGNOSIS — I25.83 CORONARY ARTERY DISEASE DUE TO LIPID RICH PLAQUE: ICD-10-CM

## 2023-01-08 DIAGNOSIS — I25.10 CORONARY ARTERY DISEASE DUE TO LIPID RICH PLAQUE: ICD-10-CM

## 2023-01-08 DIAGNOSIS — E78.5 HYPERLIPIDEMIA, UNSPECIFIED HYPERLIPIDEMIA TYPE: ICD-10-CM

## 2023-01-10 RX ORDER — ATORVASTATIN CALCIUM 80 MG/1
TABLET, FILM COATED ORAL
Qty: 30 TABLET | Refills: 0 | Status: SHIPPED | OUTPATIENT
Start: 2023-01-10 | End: 2023-01-18 | Stop reason: SDUPTHER

## 2023-01-18 ENCOUNTER — OFFICE VISIT (OUTPATIENT)
Dept: CARDIOLOGY | Facility: PHYSICIAN GROUP | Age: 48
End: 2023-01-18
Payer: COMMERCIAL

## 2023-01-18 VITALS
OXYGEN SATURATION: 98 % | RESPIRATION RATE: 16 BRPM | HEIGHT: 70 IN | SYSTOLIC BLOOD PRESSURE: 130 MMHG | BODY MASS INDEX: 31.4 KG/M2 | DIASTOLIC BLOOD PRESSURE: 88 MMHG | HEART RATE: 58 BPM | WEIGHT: 219.36 LBS

## 2023-01-18 DIAGNOSIS — Z95.5 STATUS POST INSERTION OF DRUG-ELUTING STENT INTO LEFT ANTERIOR DESCENDING (LAD) ARTERY: Chronic | ICD-10-CM

## 2023-01-18 DIAGNOSIS — I10 ESSENTIAL HYPERTENSION, BENIGN: ICD-10-CM

## 2023-01-18 DIAGNOSIS — K21.9 GASTROESOPHAGEAL REFLUX DISEASE WITHOUT ESOPHAGITIS: ICD-10-CM

## 2023-01-18 DIAGNOSIS — Z95.5 PRESENCE OF DRUG-ELUTING STENT IN LEFT CIRCUMFLEX CORONARY ARTERY: Chronic | ICD-10-CM

## 2023-01-18 DIAGNOSIS — I25.10 CORONARY ARTERY DISEASE DUE TO LIPID RICH PLAQUE: ICD-10-CM

## 2023-01-18 DIAGNOSIS — I25.83 CORONARY ARTERY DISEASE DUE TO LIPID RICH PLAQUE: ICD-10-CM

## 2023-01-18 DIAGNOSIS — E78.5 HYPERLIPIDEMIA, UNSPECIFIED HYPERLIPIDEMIA TYPE: ICD-10-CM

## 2023-01-18 PROCEDURE — 93000 ELECTROCARDIOGRAM COMPLETE: CPT | Performed by: NURSE PRACTITIONER

## 2023-01-18 PROCEDURE — 99214 OFFICE O/P EST MOD 30 MIN: CPT | Performed by: NURSE PRACTITIONER

## 2023-01-18 RX ORDER — AMLODIPINE BESYLATE 10 MG/1
10 TABLET ORAL DAILY
Qty: 100 TABLET | Refills: 3 | Status: SHIPPED | OUTPATIENT
Start: 2023-01-18 | End: 2024-01-17 | Stop reason: SDUPTHER

## 2023-01-18 RX ORDER — CLOPIDOGREL BISULFATE 75 MG/1
75 TABLET ORAL DAILY
Qty: 100 TABLET | Refills: 3 | Status: SHIPPED | OUTPATIENT
Start: 2023-01-18 | End: 2024-01-17 | Stop reason: SDUPTHER

## 2023-01-18 RX ORDER — CARVEDILOL 25 MG/1
25 TABLET ORAL 2 TIMES DAILY WITH MEALS
Qty: 200 TABLET | Refills: 3 | Status: SHIPPED | OUTPATIENT
Start: 2023-01-18 | End: 2024-01-17 | Stop reason: SDUPTHER

## 2023-01-18 RX ORDER — ATORVASTATIN CALCIUM 80 MG/1
80 TABLET, FILM COATED ORAL DAILY
Qty: 100 TABLET | Refills: 3 | Status: SHIPPED | OUTPATIENT
Start: 2023-01-18 | End: 2024-01-17 | Stop reason: SDUPTHER

## 2023-01-18 RX ORDER — LISINOPRIL 20 MG/1
20 TABLET ORAL 2 TIMES DAILY
Qty: 200 TABLET | Refills: 3 | Status: SHIPPED | OUTPATIENT
Start: 2023-01-18 | End: 2024-01-17 | Stop reason: SDUPTHER

## 2023-01-18 ASSESSMENT — ENCOUNTER SYMPTOMS
MYALGIAS: 0
DIZZINESS: 0
SHORTNESS OF BREATH: 0
ORTHOPNEA: 0
FEVER: 0
PALPITATIONS: 0
COUGH: 0
PND: 0
LOSS OF CONSCIOUSNESS: 0
BRUISES/BLEEDS EASILY: 0
CHILLS: 0
ABDOMINAL PAIN: 0
NAUSEA: 0
INSOMNIA: 0
HEADACHES: 0

## 2023-01-18 NOTE — PROGRESS NOTES
Chief Complaint   Patient presents with    Follow-Up    Coronary Artery Disease    HTN (Controlled)    Hyperlipidemia       Subjective     Haider Crawford Jr. is a 47 y.o. male who presents today for annual follow-up of CAD, HTN, and hyperlipidemia.    Haider is a 47 year old male with history of CAD, status post PCI/stents to the RCA in 2018 by Dr. Medrano at [facility], HTN and hyperlipidemia, last seen by me via TeleMedicine in [place] 2021.     In May 2020, after being off of his medications due to loss of health insurance, he was hospitalized at [facility] for NSTEMI. Coronary angiogram revealed patent RCA stents, but stenosis of the LAD and LCx, which were treated with three drug eluting stents. Medical was restarted with addition of ASA, Plavix, BB, ACEI, CCB and statin.     He is here today for anual follow-up. Generally, he has been doing well. BP has been running 140-150 systolic. He denies any chest pain, pressure or discomfort; no palpitations; no shortness of breath, orthopnea or PND; no dizziness or syncope; no LE edema. No headache or visual changes. He is compliant with his medications.       Past Medical History:   Diagnosis Date    Anginal syndrome ([facility])     At risk for sleep apnea     Back pain     Coronary artery disease due to lipid rich plaque - post PCI     Hyperlipidemia     Hypertension 09/2020    Echocardiogram with normal LV size, moderate concentric LVH, LVEF 60%. Mildly dilated RV. Trace MR, trace TR.    MI (myocardial infarction) ([facility]) 2018    PCI/EVELINE to the RCA ([facility])    NSTEMI (non-ST elevated myocardial infarction) ([facility]) 05/2020    PCI/EVELINE x 2 to the LAD, and PCI/EVELINE to the LCx    Presence of drug-eluting stent in left circumflex coronary artery     Presence of stent in right coronary artery - [facility]     Psychiatric problem     Anxiety    Status post insertion of drug-eluting stent into left anterior descending (LAD) artery 5/23/2020    Stented coronary artery      Past  Surgical History:   Procedure Laterality Date    OTHER      vasectomy    OTHER CARDIAC SURGERY       Family History   Problem Relation Age of Onset    Heart Disease Neg Hx      Social History     Socioeconomic History    Marital status:      Spouse name: Not on file    Number of children: Not on file    Years of education: Not on file    Highest education level: Not on file   Occupational History    Not on file   Tobacco Use    Smoking status: Former    Smokeless tobacco: Never   Vaping Use    Vaping Use: Never used   Substance and Sexual Activity    Alcohol use: Yes     Alcohol/week: 3.6 oz     Types: 6 Standard drinks or equivalent per week    Drug use: Never    Sexual activity: Not on file   Other Topics Concern    Not on file   Social History Narrative    Not on file     Social Determinants of Health     Financial Resource Strain: Not on file   Food Insecurity: Not on file   Transportation Needs: Not on file   Physical Activity: Not on file   Stress: Not on file   Social Connections: Not on file   Intimate Partner Violence: Not on file   Housing Stability: Not on file     No Known Allergies  Outpatient Encounter Medications as of 1/18/2023   Medication Sig Dispense Refill    amLODIPine (NORVASC) 10 MG Tab Take 1 Tablet by mouth every day. 100 Tablet 3    atorvastatin (LIPITOR) 80 MG tablet Take 1 Tablet by mouth every day. 100 Tablet 3    carvedilol (COREG) 25 MG Tab Take 1 Tablet by mouth 2 times a day with meals. 200 Tablet 3    clopidogrel (PLAVIX) 75 MG Tab Take 1 Tablet by mouth every day. 100 Tablet 3    lisinopril (PRINIVIL) 20 MG Tab Take 1 Tablet by mouth 2 times a day. 200 Tablet 3    aspirin (ASA) 81 MG Chew Tab chewable tablet Chew 81 mg every day.      [DISCONTINUED] atorvastatin (LIPITOR) 80 MG tablet TAKE ONE TABLET BY MOUTH DAILY 30 Tablet 0    [DISCONTINUED] lisinopril (PRINIVIL) 20 MG Tab TAKE ONE TABLET BY MOUTH TWICE A  Tablet 0    [DISCONTINUED] carvedilol (COREG) 25 MG Tab  "TAKE ONE TABLET BY MOUTH TWICE A DAY WITH MEALS 90 Tablet 0    [DISCONTINUED] amLODIPine (NORVASC) 10 MG Tab TAKE ONE TABLET BY MOUTH DAILY 90 Tablet 0    [DISCONTINUED] clopidogrel (PLAVIX) 75 MG Tab TAKE ONE TABLET BY MOUTH DAILY 30 Tablet 1     No facility-administered encounter medications on file as of 1/18/2023.     Review of Systems   Constitutional:  Negative for chills and fever.   HENT:  Negative for congestion.    Respiratory:  Negative for cough and shortness of breath.    Cardiovascular:  Negative for chest pain, palpitations, orthopnea, leg swelling and PND.   Gastrointestinal:  Negative for abdominal pain and nausea.   Musculoskeletal:  Negative for myalgias.   Skin:  Negative for rash.   Neurological:  Negative for dizziness, loss of consciousness and headaches.   Endo/Heme/Allergies:  Does not bruise/bleed easily.   Psychiatric/Behavioral:  The patient does not have insomnia.             Objective     /88 (BP Location: Left arm, Patient Position: Sitting, BP Cuff Size: Adult)   Pulse (!) 58   Resp 16   Ht 1.778 m (5' 10\")   Wt 99.5 kg (219 lb 5.7 oz)   SpO2 98%   BMI 31.47 kg/m²     Physical Exam  Constitutional:       Appearance: He is well-developed.   HENT:      Head: Normocephalic.   Neck:      Vascular: No JVD.   Cardiovascular:      Rate and Rhythm: Normal rate and regular rhythm.      Heart sounds: Normal heart sounds.   Pulmonary:      Effort: Pulmonary effort is normal. No respiratory distress.      Breath sounds: Normal breath sounds. No wheezing or rales.   Abdominal:      General: Bowel sounds are normal. There is no distension.      Palpations: Abdomen is soft.      Tenderness: There is no abdominal tenderness.   Musculoskeletal:         General: Normal range of motion.      Cervical back: Normal range of motion and neck supple.   Skin:     General: Skin is warm and dry.      Findings: No rash.   Neurological:      Mental Status: He is alert and oriented to person, place, " and time.   Psychiatric:         Mood and Affect: Mood normal.         Behavior: Behavior normal.     EKG ordered and interpreted by me today reveals sinus bradycardia at 53bpm. No acute ST-T wave changes.    CONCLUSIONS OF ECHOCARDIOGRAM OF 9/2/2020:  Normal left ventricular systolic function.  Left ventricular ejection fraction is visually estimated to be 60%.  Normal diastolic function.  Normal right ventricular systolic function.  No significant valve disease or flow abnormalities.      CORONARY ANGIOGRAM OF 5/23/2020:  1. Normal LV systolic function with anterior wall hypokinesis  2. NSTEMI due to 99% proximal left anterior descending artery (LAD) stenosis at origin of 2 mm first diagonal branch which also had about 80-90% ostial stenosis with SHINE II flow into the LAD  3. 70-80% proximal left circumflex artery (LCX) stenosis  4. Patents multiple previously placed RCA stents  5. Status post stenting of proximal LAD with 3x15 mm Albaro and mid LAD with 3x8 mm [place] drug eluting stent with no significant residual stenosis and SHINE III flow  6. Status post stenting of proximal LCX with 2.5x15 mm [place] EVELINE with no significant residual stenosis and SHINE III     Lab Results   Component Value Date/Time    CHOLSTRLTOT 149 10/06/2020 04:19 AM    CHOLSTRLTOT 134 05/23/2020 12:07 AM    LDL 73 05/23/2020 12:07 AM    HDL 37 (L) 10/06/2020 04:19 AM    HDL 46 05/23/2020 12:07 AM    TRIGLYCERIDE 111 10/06/2020 04:19 AM    TRIGLYCERIDE 75 05/23/2020 12:07 AM        Lab Results   Component Value Date/Time    SODIUM 142 10/06/2020 04:19 AM    SODIUM 134 (L) 05/24/2020 04:08 AM    POTASSIUM 4.8 10/06/2020 04:19 AM    POTASSIUM 3.7 05/24/2020 04:08 AM    CHLORIDE 108 (H) 10/06/2020 04:19 AM    CHLORIDE 102 05/24/2020 04:08 AM    CO2 21 10/06/2020 04:19 AM    CO2 20 05/24/2020 04:08 AM    GLUCOSE 92 10/06/2020 04:19 AM    GLUCOSE 98 05/24/2020 04:08 AM    BUN 24 10/06/2020 04:19 AM    BUN 19 05/24/2020 04:08 AM    CREATININE 1.16  10/06/2020 04:19 AM    CREATININE 1.27 05/24/2020 04:08 AM    BUNCREATRAT 21 (H) 10/06/2020 04:19 AM        Assessment & Plan     1. Coronary artery disease due to lipid rich plaque - post PCI  atorvastatin (LIPITOR) 80 MG tablet    clopidogrel (PLAVIX) 75 MG Tab    lisinopril (PRINIVIL) 20 MG Tab    EKG      2. Status post insertion of drug-eluting stent into left anterior descending (LAD) artery  clopidogrel (PLAVIX) 75 MG Tab      3. Presence of drug-eluting stent in left circumflex coronary artery        4. Essential hypertension, benign  CBC WITH DIFFERENTIAL    amLODIPine (NORVASC) 10 MG Tab    carvedilol (COREG) 25 MG Tab    lisinopril (PRINIVIL) 20 MG Tab      5. Hyperlipidemia, unspecified hyperlipidemia type  Comp Metabolic Panel    Lipid Profile    atorvastatin (LIPITOR) 80 MG tablet      6. Gastroesophageal reflux disease without esophagitis            Medical Decision Making: Today's Assessment/Status/Plan:      1. CAD, status post PCI/stents to the RCA in 2018, and PCI/EVELINE to the LAD and LCx in 2020, on ASA, Plavix, Coreg, Lisinopril and Lipitor. No angina or shortness of breath.    2. Hypertension, treated with Coreg 25mg BID, Lisinopril 20mg BID and Amlodipine 10mg once daily. BP is mostly controlled; to monitor BP at home.    3. Hyperlipidemia, treated with Lipitor 80mg. To check fasting CMP and lipid panel.    4. GERD, treated with OTC Rolaids, stable.    Same medications for now. Monitor BP at home. Labs as above. Follow-up annually, sooner if clinical condition changes.

## 2023-01-21 LAB — EKG IMPRESSION: NORMAL

## 2023-08-31 DIAGNOSIS — I25.83 CORONARY ARTERY DISEASE DUE TO LIPID RICH PLAQUE: ICD-10-CM

## 2023-08-31 DIAGNOSIS — I25.10 CORONARY ARTERY DISEASE DUE TO LIPID RICH PLAQUE: ICD-10-CM

## 2024-01-10 ENCOUNTER — TELEPHONE (OUTPATIENT)
Dept: CARDIOLOGY | Facility: PHYSICIAN GROUP | Age: 49
End: 2024-01-10
Payer: COMMERCIAL

## 2024-01-17 ENCOUNTER — TELEPHONE (OUTPATIENT)
Dept: CARDIOLOGY | Facility: MEDICAL CENTER | Age: 49
End: 2024-01-17

## 2024-01-17 ENCOUNTER — OFFICE VISIT (OUTPATIENT)
Dept: CARDIOLOGY | Facility: PHYSICIAN GROUP | Age: 49
End: 2024-01-17
Payer: COMMERCIAL

## 2024-01-17 VITALS
SYSTOLIC BLOOD PRESSURE: 120 MMHG | BODY MASS INDEX: 31.66 KG/M2 | RESPIRATION RATE: 12 BRPM | WEIGHT: 221.12 LBS | HEIGHT: 70 IN | HEART RATE: 57 BPM | OXYGEN SATURATION: 98 % | DIASTOLIC BLOOD PRESSURE: 76 MMHG

## 2024-01-17 DIAGNOSIS — I25.10 CORONARY ARTERY DISEASE DUE TO LIPID RICH PLAQUE: ICD-10-CM

## 2024-01-17 DIAGNOSIS — Z95.5 STATUS POST INSERTION OF DRUG-ELUTING STENT INTO LEFT ANTERIOR DESCENDING (LAD) ARTERY: Chronic | ICD-10-CM

## 2024-01-17 DIAGNOSIS — E78.5 HYPERLIPIDEMIA, UNSPECIFIED HYPERLIPIDEMIA TYPE: ICD-10-CM

## 2024-01-17 DIAGNOSIS — N52.9 ERECTILE DYSFUNCTION, UNSPECIFIED ERECTILE DYSFUNCTION TYPE: ICD-10-CM

## 2024-01-17 DIAGNOSIS — I10 ESSENTIAL HYPERTENSION, BENIGN: ICD-10-CM

## 2024-01-17 DIAGNOSIS — I25.83 CORONARY ARTERY DISEASE DUE TO LIPID RICH PLAQUE: ICD-10-CM

## 2024-01-17 DIAGNOSIS — Z95.5 PRESENCE OF DRUG-ELUTING STENT IN LEFT CIRCUMFLEX CORONARY ARTERY: Chronic | ICD-10-CM

## 2024-01-17 DIAGNOSIS — Z95.5 PRESENCE OF STENT IN RIGHT CORONARY ARTERY: Chronic | ICD-10-CM

## 2024-01-17 PROCEDURE — 3078F DIAST BP <80 MM HG: CPT | Performed by: NURSE PRACTITIONER

## 2024-01-17 PROCEDURE — 3074F SYST BP LT 130 MM HG: CPT | Performed by: NURSE PRACTITIONER

## 2024-01-17 PROCEDURE — 99214 OFFICE O/P EST MOD 30 MIN: CPT | Performed by: NURSE PRACTITIONER

## 2024-01-17 RX ORDER — SILDENAFIL 50 MG/1
50 TABLET, FILM COATED ORAL
Qty: 10 TABLET | Refills: 3 | Status: SHIPPED | OUTPATIENT
Start: 2024-01-17

## 2024-01-17 RX ORDER — ATORVASTATIN CALCIUM 80 MG/1
80 TABLET, FILM COATED ORAL DAILY
Qty: 100 TABLET | Refills: 3 | Status: SHIPPED | OUTPATIENT
Start: 2024-01-17

## 2024-01-17 RX ORDER — AMLODIPINE BESYLATE 10 MG/1
10 TABLET ORAL DAILY
Qty: 100 TABLET | Refills: 3 | Status: SHIPPED
Start: 2024-01-17

## 2024-01-17 RX ORDER — LISINOPRIL 20 MG/1
20 TABLET ORAL 2 TIMES DAILY
Qty: 200 TABLET | Refills: 3 | Status: SHIPPED | OUTPATIENT
Start: 2024-01-17

## 2024-01-17 RX ORDER — CLOPIDOGREL BISULFATE 75 MG/1
75 TABLET ORAL DAILY
Qty: 100 TABLET | Refills: 3 | Status: SHIPPED | OUTPATIENT
Start: 2024-01-17

## 2024-01-17 RX ORDER — HYDRALAZINE HYDROCHLORIDE 25 MG/1
25 TABLET, FILM COATED ORAL
Qty: 30 TABLET | Refills: 1 | Status: SHIPPED
Start: 2024-01-17

## 2024-01-17 RX ORDER — CARVEDILOL 25 MG/1
25 TABLET ORAL 2 TIMES DAILY WITH MEALS
Qty: 200 TABLET | Refills: 3 | Status: SHIPPED | OUTPATIENT
Start: 2024-01-17

## 2024-01-17 ASSESSMENT — ENCOUNTER SYMPTOMS
COUGH: 0
ORTHOPNEA: 0
PND: 0
PALPITATIONS: 0
ABDOMINAL PAIN: 0
INSOMNIA: 0
NAUSEA: 0
HEADACHES: 0
BRUISES/BLEEDS EASILY: 0
SHORTNESS OF BREATH: 0
FEVER: 0
DIZZINESS: 0
MYALGIAS: 0
LOSS OF CONSCIOUSNESS: 0
CHILLS: 0

## 2024-01-17 NOTE — TELEPHONE ENCOUNTER
Kristin, please mail Lipid Profile lab order to pt. Thank you!      ----- Message from MERCEDES Musa -----  Regarding: Lipid panel  Kylee,    I just saw this patient today in [place]. We got his labs from [facility] on 1/16/2024, but it was JUST a CMP.    I ordered a lipid panel, and sent him a Take the Interview message asking him to get this completed.    Can you please send him a hard copy of the order?    Thank you!  AB

## 2024-01-17 NOTE — PROGRESS NOTES
Chief Complaint   Patient presents with    Follow-Up    Coronary Artery Disease    HTN (Controlled)    Hyperlipidemia    Gastrophageal Reflux       Subjective     Haider Crawford Jr. is a 48 y.o. male who presents today for annual follow-up of CAD, HTN, hyperlipidemia, and GERD.    Haider is a 48 year old male with history of CAD, status post PCI/stents to the RCA in 2018 by Dr. Medrano at [facility], HTN and hyperlipidemia.     In May 2020, after being off of his medications due to loss of health insurance, he was hospitalized at [facility] for NSTEMI. Coronary angiogram revealed patent RCA stents, but stenosis of the LAD and LCx, which were treated with three drug eluting stents. Medical was restarted. I last saw him in January 2023.     He is here today for anual follow-up. Generally, he has been doing well. He remains active at work, and walks regularly. He has been trying to make some positive changes in his diet.    BP has been running 130-150 systolic. He denies any chest pain, pressure, tightness or discomfort; no symptomatic palpitations; no shortness of breath, orthopnea or PND; no dizziness or syncope; no LE edema. He is compliant with his medications. He is inquiring about possible Viagra Rx for ED.     Past Medical History:   Diagnosis Date    Anginal syndrome ([facility])     At risk for sleep apnea     Back pain     Coronary artery disease due to lipid rich plaque - post PCI     Hyperlipidemia     Hypertension 09/2020    Echocardiogram with normal LV size, moderate concentric LVH, LVEF 60%. Mildly dilated RV. Trace MR, trace TR.    MI (myocardial infarction) ([facility]) 2018    [facility]EL[facility]    NSTEMI (non-ST elevated myocardial infarction) ([facility]) 05/2020    PCI/EVELINE x 2 to the LAD, and PCI/EVELINE to the LCx    Presence of drug-eluting stent in left circumflex coronary artery     Presence of stent in right coronary artery - [facility]     Psychiatric problem     Anxiety    Status post insertion of  drug-eluting stent into left anterior descending (LAD) artery 5/23/2020    Stented coronary artery      Past Surgical History:   Procedure Laterality Date    OTHER      vasectomy    OTHER CARDIAC SURGERY       Family History   Problem Relation Age of Onset    Heart Disease Neg Hx      Social History     Socioeconomic History    Marital status:      Spouse name: Not on file    Number of children: Not on file    Years of education: Not on file    Highest education level: Not on file   Occupational History    Not on file   Tobacco Use    Smoking status: Former    Smokeless tobacco: Never   Vaping Use    Vaping Use: Never used   Substance and Sexual Activity    Alcohol use: Yes     Alcohol/week: 3.6 oz     Types: 6 Standard drinks or equivalent per week    Drug use: Never    Sexual activity: Not on file   Other Topics Concern    Not on file   Social History Narrative    Not on file     Social Determinants of Health     Financial Resource Strain: Not on file   Food Insecurity: Unknown (5/22/2020)    Hunger Vital Sign     Worried About Running Out of Food in the Last Year: Patient refused     Ran Out of Food in the Last Year: Patient refused   Transportation Needs: Unknown (5/22/2020)    PRAPARE - Transportation     Lack of Transportation (Medical): Patient refused     Lack of Transportation (Non-Medical): Patient refused   Physical Activity: Not on file   Stress: Not on file   Social Connections: Not on file   Intimate Partner Violence: Not on file   Housing Stability: Not on file     No Known Allergies  Outpatient Encounter Medications as of 1/17/2024   Medication Sig Dispense Refill    atorvastatin (LIPITOR) 80 MG tablet Take 1 Tablet by mouth every day. 100 Tablet 3    carvedilol (COREG) 25 MG Tab Take 1 Tablet by mouth 2 times a day with meals. 200 Tablet 3    lisinopril (PRINIVIL) 20 MG Tab Take 1 Tablet by mouth 2 times a day. 200 Tablet 3    amLODIPine (NORVASC) 10 MG Tab Take 1 Tablet by mouth every day.  "100 Tablet 3    hydrALAZINE (APRESOLINE) 25 MG Tab Take 1 Tablet by mouth 1 time a day as needed (BP > 155 systolic). 30 Tablet 1    clopidogrel (PLAVIX) 75 MG Tab Take 1 Tablet by mouth every day. 100 Tablet 3    sildenafil citrate (VIAGRA) 50 MG tablet Take 1 Tablet by mouth 1 time a day as needed for Erectile Dysfunction. 10 Tablet 3    [DISCONTINUED] amLODIPine (NORVASC) 10 MG Tab Take 1 Tablet by mouth every day. 100 Tablet 3    [DISCONTINUED] atorvastatin (LIPITOR) 80 MG tablet Take 1 Tablet by mouth every day. 100 Tablet 3    [DISCONTINUED] carvedilol (COREG) 25 MG Tab Take 1 Tablet by mouth 2 times a day with meals. 200 Tablet 3    [DISCONTINUED] clopidogrel (PLAVIX) 75 MG Tab Take 1 Tablet by mouth every day. 100 Tablet 3    [DISCONTINUED] lisinopril (PRINIVIL) 20 MG Tab Take 1 Tablet by mouth 2 times a day. 200 Tablet 3    [DISCONTINUED] aspirin (ASA) 81 MG Chew Tab chewable tablet Chew 81 mg every day.       No facility-administered encounter medications on file as of 1/17/2024.     Review of Systems   Constitutional:  Negative for chills and fever.   HENT:  Negative for congestion.    Respiratory:  Negative for cough and shortness of breath.    Cardiovascular:  Negative for chest pain, palpitations, orthopnea, leg swelling and PND.   Gastrointestinal:  Negative for abdominal pain and nausea.   Musculoskeletal:  Negative for myalgias.   Skin:  Negative for rash.   Neurological:  Negative for dizziness, loss of consciousness and headaches.   Endo/Heme/Allergies:  Does not bruise/bleed easily.   Psychiatric/Behavioral:  The patient does not have insomnia.               Objective     /76 (BP Location: Right arm, Patient Position: Sitting, BP Cuff Size: Large adult)   Pulse (!) 57   Resp 12   Ht 1.778 m (5' 10\")   Wt 100 kg (221 lb 1.9 oz)   SpO2 98%   BMI 31.73 kg/m²     Physical Exam  Constitutional:       Appearance: He is well-developed.   HENT:      Head: Normocephalic.   Neck:      Vascular: " No JVD.   Cardiovascular:      Rate and Rhythm: Normal rate and regular rhythm.      Heart sounds: Normal heart sounds.   Pulmonary:      Effort: Pulmonary effort is normal. No respiratory distress.      Breath sounds: Normal breath sounds. No wheezing or rales.   Abdominal:      General: Bowel sounds are normal. There is no distension.      Palpations: Abdomen is soft.      Tenderness: There is no abdominal tenderness.   Musculoskeletal:         General: Normal range of motion.      Cervical back: Normal range of motion and neck supple.   Skin:     General: Skin is warm and dry.      Findings: No rash.   Neurological:      Mental Status: He is alert and oriented to person, place, and time.   Psychiatric:         Mood and Affect: Mood normal.         Behavior: Behavior normal.       CONCLUSIONS OF ECHOCARDIOGRAM OF 9/2/2020:  Normal left ventricular systolic function.  Left ventricular ejection fraction is visually estimated to be 60%.  Normal diastolic function.  Normal right ventricular systolic function.  No significant valve disease or flow abnormalities.      CORONARY ANGIOGRAM OF 5/23/2020:  1. Normal LV systolic function with anterior wall hypokinesis  2. NSTEMI due to 99% proximal left anterior descending artery (LAD) stenosis at origin of 2 mm first diagonal branch which also had about 80-90% ostial stenosis with SHINE II flow into the LAD  3. 70-80% proximal left circumflex artery (LCX) stenosis  4. Patents multiple previously placed RCA stents  5. Status post stenting of proximal LAD with 3x15 mm Albaro and mid LAD with 3x8 mm Albaro drug eluting stent with no significant residual stenosis and SHINE III flow  6. Status post stenting of proximal LCX with 2.5x15 mm [place] EVELINE with no significant residual stenosis and SHINE III    LABS OF 1/16/2024:  Glucose 95  BUN 21  Creatinine 1.03  GFR 88  Potassium 4.2  AST 20  ALT 21     Lab Results   Component Value Date/Time    CHOLSTRLTOT 149 10/06/2020 04:19 AM     CHOLSTRLTOT 134 05/23/2020 12:07 AM    LDL 73 05/23/2020 12:07 AM    HDL 37 (L) 10/06/2020 04:19 AM    HDL 46 05/23/2020 12:07 AM    TRIGLYCERIDE 111 10/06/2020 04:19 AM    TRIGLYCERIDE 75 05/23/2020 12:07 AM        Lab Results   Component Value Date/Time    SODIUM 142 10/06/2020 04:19 AM    SODIUM 134 (L) 05/24/2020 04:08 AM    POTASSIUM 4.8 10/06/2020 04:19 AM    POTASSIUM 3.7 05/24/2020 04:08 AM    CHLORIDE 108 (H) 10/06/2020 04:19 AM    CHLORIDE 102 05/24/2020 04:08 AM    CO2 21 10/06/2020 04:19 AM    CO2 20 05/24/2020 04:08 AM    GLUCOSE 92 10/06/2020 04:19 AM    GLUCOSE 98 05/24/2020 04:08 AM    BUN 24 10/06/2020 04:19 AM    BUN 19 05/24/2020 04:08 AM    CREATININE 1.16 10/06/2020 04:19 AM    CREATININE 1.27 05/24/2020 04:08 AM    BUNCREATRAT 21 (H) 10/06/2020 04:19 AM        Assessment & Plan     1. Coronary artery disease due to lipid rich plaque - post PCI  atorvastatin (LIPITOR) 80 MG tablet    lisinopril (PRINIVIL) 20 MG Tab    clopidogrel (PLAVIX) 75 MG Tab      2. Presence of stent in right coronary artery - CT        3. Status post insertion of drug-eluting stent into left anterior descending (LAD) artery  clopidogrel (PLAVIX) 75 MG Tab      4. Presence of drug-eluting stent in left circumflex coronary artery        5. Essential hypertension, benign  carvedilol (COREG) 25 MG Tab    lisinopril (PRINIVIL) 20 MG Tab    amLODIPine (NORVASC) 10 MG Tab    hydrALAZINE (APRESOLINE) 25 MG Tab      6. Hyperlipidemia, unspecified hyperlipidemia type  atorvastatin (LIPITOR) 80 MG tablet      7. Erectile dysfunction, unspecified erectile dysfunction type  sildenafil citrate (VIAGRA) 50 MG tablet          Medical Decision Making: Today's Assessment/Status/Plan:          1. CAD, status post PCI/stents to the RCA in 2018, and PCI/EVELINE to the LAD and LCx in 2020, stable with no angina or shortness of breath. He remains active. Consider repeat echo next year. Continue:  Plavix 75m once daily  Coreg 25mg twice  daily  Lisinopril 20mg twice daily  Amlodipine 10mg once daily     2. Hypertension, treated with Coreg, Lisinopril and Amlodipine. BP is good today. To ADD Hydralazine 25mg PRN systolic BP>155.     3. Hyperlipidemia, treated with Lipitor 80mg. To obtain update lipid panel. LDL goal is <70.     4. GERD, treated with OTC Rolaids, stable. No recent problems.    5. ED, to try Viagra 50mg PRN. If he needs refills, OK to dispense.    Same medications for now. Can add PRN Hydralazine for systolic BP>155. Will repeat echo next year. Continue with positively changed lifestyle modifications (exercise and dietary changes).    Follow-up annually, sooner if clinical condition changes.     Same medications for now. Monitor BP at home. Labs as above. Follow-up annually, sooner if clinical condition changes.

## 2024-01-18 DIAGNOSIS — E78.5 HYPERLIPIDEMIA, UNSPECIFIED HYPERLIPIDEMIA TYPE: ICD-10-CM

## 2024-02-04 NOTE — TELEPHONE ENCOUNTER
Received fax from [facility] DENT[facility] DEPT  FAX: [phone]  TELE: [phone]         Requesting cardiac clearance for upcoming  EXTRACTION  Scheduled on:  N/A    Pt currently taking Plavix 75mg daily  Does pt require prophylactic antibiotic?            .    HX: CAD, HTN, PCI/stents 2018, NSTEMI 5/2020    To AB   Prophylactic measure

## 2024-03-11 DIAGNOSIS — I25.10 CORONARY ARTERY DISEASE DUE TO LIPID RICH PLAQUE: ICD-10-CM

## 2024-03-11 DIAGNOSIS — I25.83 CORONARY ARTERY DISEASE DUE TO LIPID RICH PLAQUE: ICD-10-CM

## 2024-08-16 DIAGNOSIS — N52.9 ERECTILE DYSFUNCTION, UNSPECIFIED ERECTILE DYSFUNCTION TYPE: ICD-10-CM

## 2024-08-19 RX ORDER — SILDENAFIL 50 MG/1
50 TABLET, FILM COATED ORAL
Qty: 10 TABLET | Refills: 1 | Status: SHIPPED | OUTPATIENT
Start: 2024-08-19

## 2024-08-19 NOTE — TELEPHONE ENCOUNTER
Is the patient due for a refill? Yes    Was the patient seen the past year? Yes    Date of last office visit: 01.17.2024    Does the patient have an upcoming appointment?  Yes   If yes, When? 01.22.2025    Provider to refill:AB    Does the patient have senior living Plus and need 100-day supply? (This applies to ALL medications) Patient does not have SCP

## 2025-01-14 ENCOUNTER — TELEPHONE (OUTPATIENT)
Dept: CARDIOLOGY | Facility: PHYSICIAN GROUP | Age: 50
End: 2025-01-14
Payer: COMMERCIAL

## 2025-01-14 NOTE — TELEPHONE ENCOUNTER
Called pt in regards to lab work that was ordered at previous OV.No answer, LVM to call back. Pt has follow up appointment scheduled with AB on 01.22.2025.

## 2025-01-17 LAB
CHOLEST SERPL-MCNC: 176 MG/DL (ref 100–199)
HDLC SERPL-MCNC: 41 MG/DL
LDL CALC COMMENT:: ABNORMAL
LDLC SERPL CALC-MCNC: 110 MG/DL (ref 0–99)
TRIGL SERPL-MCNC: 140 MG/DL (ref 0–149)
VLDLC SERPL CALC-MCNC: 25 MG/DL (ref 5–40)

## 2025-01-22 ENCOUNTER — OFFICE VISIT (OUTPATIENT)
Dept: CARDIOLOGY | Facility: PHYSICIAN GROUP | Age: 50
End: 2025-01-22
Payer: COMMERCIAL

## 2025-01-22 VITALS
RESPIRATION RATE: 12 BRPM | OXYGEN SATURATION: 97 % | HEIGHT: 71 IN | HEART RATE: 60 BPM | WEIGHT: 220.46 LBS | BODY MASS INDEX: 30.86 KG/M2 | DIASTOLIC BLOOD PRESSURE: 70 MMHG | SYSTOLIC BLOOD PRESSURE: 128 MMHG

## 2025-01-22 DIAGNOSIS — N52.9 ERECTILE DYSFUNCTION, UNSPECIFIED ERECTILE DYSFUNCTION TYPE: ICD-10-CM

## 2025-01-22 DIAGNOSIS — I25.83 CORONARY ARTERY DISEASE DUE TO LIPID RICH PLAQUE: ICD-10-CM

## 2025-01-22 DIAGNOSIS — Z95.5 PRESENCE OF STENT IN RIGHT CORONARY ARTERY: Chronic | ICD-10-CM

## 2025-01-22 DIAGNOSIS — I10 ESSENTIAL HYPERTENSION, BENIGN: ICD-10-CM

## 2025-01-22 DIAGNOSIS — Z95.5 PRESENCE OF DRUG-ELUTING STENT IN LEFT CIRCUMFLEX CORONARY ARTERY: Chronic | ICD-10-CM

## 2025-01-22 DIAGNOSIS — Z95.5 STATUS POST INSERTION OF DRUG-ELUTING STENT INTO LEFT ANTERIOR DESCENDING (LAD) ARTERY: Chronic | ICD-10-CM

## 2025-01-22 DIAGNOSIS — E78.5 HYPERLIPIDEMIA, UNSPECIFIED HYPERLIPIDEMIA TYPE: ICD-10-CM

## 2025-01-22 DIAGNOSIS — I25.10 CORONARY ARTERY DISEASE DUE TO LIPID RICH PLAQUE: ICD-10-CM

## 2025-01-22 DIAGNOSIS — K21.9 GASTROESOPHAGEAL REFLUX DISEASE WITHOUT ESOPHAGITIS: ICD-10-CM

## 2025-01-22 PROCEDURE — 3074F SYST BP LT 130 MM HG: CPT | Performed by: NURSE PRACTITIONER

## 2025-01-22 PROCEDURE — 3078F DIAST BP <80 MM HG: CPT | Performed by: NURSE PRACTITIONER

## 2025-01-22 PROCEDURE — 99214 OFFICE O/P EST MOD 30 MIN: CPT | Performed by: NURSE PRACTITIONER

## 2025-01-22 RX ORDER — EZETIMIBE 10 MG/1
10 TABLET ORAL DAILY
Qty: 100 TABLET | Refills: 3 | Status: SHIPPED | OUTPATIENT
Start: 2025-01-22

## 2025-01-22 RX ORDER — CLOPIDOGREL BISULFATE 75 MG/1
75 TABLET ORAL DAILY
Qty: 100 TABLET | Refills: 3 | Status: SHIPPED | OUTPATIENT
Start: 2025-01-22

## 2025-01-22 RX ORDER — LISINOPRIL 20 MG/1
20 TABLET ORAL 2 TIMES DAILY
Qty: 200 TABLET | Refills: 3 | Status: SHIPPED | OUTPATIENT
Start: 2025-01-22

## 2025-01-22 RX ORDER — SILDENAFIL 50 MG/1
50 TABLET, FILM COATED ORAL
Qty: 10 TABLET | Refills: 1 | Status: SHIPPED | OUTPATIENT
Start: 2025-01-22

## 2025-01-22 RX ORDER — CARVEDILOL 25 MG/1
25 TABLET ORAL 2 TIMES DAILY WITH MEALS
Qty: 200 TABLET | Refills: 3 | Status: SHIPPED | OUTPATIENT
Start: 2025-01-22

## 2025-01-22 RX ORDER — AMLODIPINE BESYLATE 10 MG/1
10 TABLET ORAL DAILY
Qty: 100 TABLET | Refills: 3 | Status: SHIPPED | OUTPATIENT
Start: 2025-01-22

## 2025-01-22 RX ORDER — HYDRALAZINE HYDROCHLORIDE 25 MG/1
25 TABLET, FILM COATED ORAL
Qty: 30 TABLET | Refills: 1 | Status: SHIPPED | OUTPATIENT
Start: 2025-01-22

## 2025-01-22 RX ORDER — ROSUVASTATIN CALCIUM 40 MG/1
40 TABLET, COATED ORAL DAILY
Qty: 100 TABLET | Refills: 3 | Status: SHIPPED | OUTPATIENT
Start: 2025-01-22 | End: 2026-02-26

## 2025-01-22 ASSESSMENT — ENCOUNTER SYMPTOMS
COUGH: 0
FEVER: 0
NAUSEA: 0
MYALGIAS: 0
INSOMNIA: 0
BRUISES/BLEEDS EASILY: 0
SHORTNESS OF BREATH: 0
PALPITATIONS: 0
CHILLS: 0
HEADACHES: 0
LOSS OF CONSCIOUSNESS: 0
DIZZINESS: 0
PND: 0
ORTHOPNEA: 0
ABDOMINAL PAIN: 0

## 2025-01-22 NOTE — PROGRESS NOTES
Chief Complaint   Patient presents with    Follow-Up    Coronary Artery Disease    HTN (Controlled)    Hyperlipidemia    Gastrophageal Reflux       Subjective     Haider Crawford Jr. is a 49 y.o. male who presents today for annual follow-up of CAD, HTN, hyperlipidemia, and GERD.    Haider is a 49 year old male with history of CAD, status post PCI/stents to the RCA in 2018 by Dr. Medrano at [facility], HTN and hyperlipidemia.     In May 2020, after being off of his medications due to loss of health insurance, he was hospitalized at [facility], [place] for NSTEMI. Coronary angiogram revealed patent RCA stents, but stenosis of the LAD and LCx, which were treated with three drug eluting stents. Medical was restarted. I last saw him in January 2024.     He is here today for anual follow-up. Physically, he feels well:  he denies any chest pain, pressure, tightness or discomfort; no symptomatic palpitations; no shortness of breath, orthopnea or PND; no dizziness or syncope; no LE edema. He is compliant with his medications.     He does admit to some dietary indiscretions over the holidays, which explains his recent elevated lipid panel.     Past Medical History:   Diagnosis Date    Anginal syndrome ([facility])     At risk for sleep apnea     Back pain     Coronary artery disease due to lipid rich plaque - post PCI     Hyperlipidemia     Hypertension 09/2020    Echocardiogram with normal LV size, moderate concentric LVH, LVEF 60%. Mildly dilated RV. Trace MR, trace TR.    MI (myocardial infarction) ([facility]) 2018    PCI/EVELINE to the RCA ([facility])    NSTEMI (non-ST elevated myocardial infarction) ([facility]) 05/2020    PCI/EVELINE x 2 to the LAD, and PCI/EVELINE to the LCx    Presence of drug-eluting stent in left circumflex coronary artery     Presence of stent in right coronary artery - [facility]     Psychiatric problem     Anxiety    Status post insertion of drug-eluting stent into left anterior descending (LAD) artery 5/23/2020    Stented coronary  artery      Past Surgical History:   Procedure Laterality Date    OTHER      vasectomy    OTHER CARDIAC SURGERY       Family History   Problem Relation Age of Onset    Heart Disease Neg Hx      Social History     Socioeconomic History    Marital status:      Spouse name: Not on file    Number of children: Not on file    Years of education: Not on file    Highest education level: Not on file   Occupational History    Not on file   Tobacco Use    Smoking status: Former    Smokeless tobacco: Never   Vaping Use    Vaping status: Never Used   Substance and Sexual Activity    Alcohol use: Yes     Alcohol/week: 3.6 oz     Types: 6 Standard drinks or equivalent per week    Drug use: Never    Sexual activity: Not on file   Other Topics Concern    Not on file   Social History Narrative    Not on file     Social Drivers of Health     Financial Resource Strain: Not on File (2024)    Received from [employer]    Financial Resource Strain     Financial Resource Strain: 0   Food Insecurity: Not on File (2024)    Received from [employer]    Food Insecurity     Food: 0   Transportation Needs: Not on File (2024)    Received from [employer]    Transportation Needs     Transportation: 0   Physical Activity: Not on File (2024)    Received from [employer]    Physical Activity     Physical Activity: 0   Stress: Not on File (2024)    Received from [employer]    Stress     Stress: 0   Social Connections: Not on File (2024)    Received from [employer]    Social Connections     Connectedness: 0   Intimate Partner Violence: Not on file   Housing Stability: Not on File (2024)    Received from [employer]    Housing Stability     Housin     No Known Allergies  Outpatient Encounter Medications as of 2025   Medication Sig Dispense Refill    rosuvastatin (CRESTOR) 40 MG tablet Take 1 Tablet by mouth every day. 100 Tablet 3    ezetimibe (ZETIA) 10 MG Tab Take 1 Tablet by mouth every day. 100 Tablet 3    clopidogrel (PLAVIX) 75 MG Tab Take  1 Tablet by mouth every day. 100 Tablet 3    sildenafil citrate (VIAGRA) 50 MG tablet Take 1 Tablet by mouth 1 time a day as needed for Erectile Dysfunction. 10 Tablet 1    carvedilol (COREG) 25 MG Tab Take 1 Tablet by mouth 2 times a day with meals. 200 Tablet 3    hydrALAZINE (APRESOLINE) 25 MG Tab Take 1 Tablet by mouth 1 time a day as needed (BP > 155 systolic). 30 Tablet 1    amLODIPine (NORVASC) 10 MG Tab Take 1 Tablet by mouth every day. 100 Tablet 3    lisinopril (PRINIVIL) 20 MG Tab Take 1 Tablet by mouth 2 times a day. 200 Tablet 3    [DISCONTINUED] sildenafil citrate (VIAGRA) 50 MG tablet Take 1 Tablet by mouth 1 time a day as needed for Erectile Dysfunction. 10 Tablet 1    [DISCONTINUED] atorvastatin (LIPITOR) 80 MG tablet Take 1 Tablet by mouth every day. 100 Tablet 3    [DISCONTINUED] carvedilol (COREG) 25 MG Tab Take 1 Tablet by mouth 2 times a day with meals. 200 Tablet 3    [DISCONTINUED] lisinopril (PRINIVIL) 20 MG Tab Take 1 Tablet by mouth 2 times a day. 200 Tablet 3    [DISCONTINUED] amLODIPine (NORVASC) 10 MG Tab Take 1 Tablet by mouth every day. 100 Tablet 3    [DISCONTINUED] hydrALAZINE (APRESOLINE) 25 MG Tab Take 1 Tablet by mouth 1 time a day as needed (BP > 155 systolic). 30 Tablet 1    [DISCONTINUED] clopidogrel (PLAVIX) 75 MG Tab Take 1 Tablet by mouth every day. 100 Tablet 3     No facility-administered encounter medications on file as of 1/22/2025.     Review of Systems   Constitutional:  Negative for chills and fever.   HENT:  Negative for congestion.    Respiratory:  Negative for cough and shortness of breath.    Cardiovascular:  Negative for chest pain, palpitations, orthopnea, leg swelling and PND.   Gastrointestinal:  Negative for abdominal pain and nausea.   Musculoskeletal:  Negative for myalgias.   Skin:  Negative for rash.   Neurological:  Negative for dizziness, loss of consciousness and headaches.   Endo/Heme/Allergies:  Does not bruise/bleed easily.  "  Psychiatric/Behavioral:  The patient does not have insomnia.               Objective     /70 (BP Location: Left arm, Patient Position: Sitting, BP Cuff Size: Adult)   Pulse 60   Resp 12   Ht 1.803 m (5' 11\")   Wt 100 kg (220 lb 7.4 oz)   SpO2 97%   BMI 30.75 kg/m²     Physical Exam  Constitutional:       Appearance: He is well-developed.      Comments: BMI 30.75   HENT:      Head: Normocephalic.   Neck:      Vascular: No JVD.   Cardiovascular:      Rate and Rhythm: Normal rate and regular rhythm.      Heart sounds: Normal heart sounds.   Pulmonary:      Effort: Pulmonary effort is normal. No respiratory distress.      Breath sounds: Normal breath sounds. No wheezing or rales.   Abdominal:      General: Bowel sounds are normal. There is no distension.      Palpations: Abdomen is soft.      Tenderness: There is no abdominal tenderness.   Musculoskeletal:         General: Normal range of motion.      Cervical back: Normal range of motion and neck supple.   Skin:     General: Skin is warm and dry.      Findings: No rash.   Neurological:      Mental Status: He is alert and oriented to person, place, and time.   Psychiatric:         Mood and Affect: Mood normal.         Behavior: Behavior normal.       CONCLUSIONS OF ECHOCARDIOGRAM OF 9/2/2020:  Normal left ventricular systolic function.  Left ventricular ejection fraction is visually estimated to be 60%.  Normal diastolic function.  Normal right ventricular systolic function.  No significant valve disease or flow abnormalities.      CORONARY ANGIOGRAM OF 5/23/2020:  1. Normal LV systolic function with anterior wall hypokinesis  2. NSTEMI due to 99% proximal left anterior descending artery (LAD) stenosis at origin of 2 mm first diagonal branch which also had about 80-90% ostial stenosis with SHINE II flow into the LAD  3. 70-80% proximal left circumflex artery (LCX) stenosis  4. Patents multiple previously placed RCA stents  5. Status post stenting of " proximal LAD with 3x15 mm [place] and mid LAD with 3x8 mm [place] drug eluting stent with no significant residual stenosis and SHINE III flow  6. Status post stenting of proximal LCX with 2.5x15 mm [place] EVELINE with no significant residual stenosis and SHINE III           Component      Latest Ref Rng 1/16/2025   Cholesterol,Tot      100 - 199 mg/dL 176    Triglycerides      0 - 149 mg/dL 140    HDL      >39 mg/dL 41    VLDL Cholesterol Calc      5 - 40 mg/dL 25    LDL Chol Calc ([facility])      0 - 99 mg/dL 110 (H)               Assessment & Plan     1. Coronary artery disease due to lipid rich plaque - post PCI  clopidogrel (PLAVIX) 75 MG Tab    lisinopril (PRINIVIL) 20 MG Tab      2. Status post insertion of drug-eluting stent into left anterior descending (LAD) artery  clopidogrel (PLAVIX) 75 MG Tab      3. Presence of drug-eluting stent in left circumflex coronary artery        4. Presence of stent in right coronary artery - CT        5. Essential hypertension, benign  carvedilol (COREG) 25 MG Tab    hydrALAZINE (APRESOLINE) 25 MG Tab    amLODIPine (NORVASC) 10 MG Tab    lisinopril (PRINIVIL) 20 MG Tab      6. Hyperlipidemia, unspecified hyperlipidemia type  Comp Metabolic Panel    Lipid Profile    rosuvastatin (CRESTOR) 40 MG tablet    ezetimibe (ZETIA) 10 MG Tab      7. Erectile dysfunction, unspecified erectile dysfunction type  sildenafil citrate (VIAGRA) 50 MG tablet      8. Gastroesophageal reflux disease without esophagitis            Medical Decision Making: Today's Assessment/Status/Plan:        1. CAD, status post PCI/stents to the RCA in 2018, and PCI/EVELINE to the LAD and LCx in 2020, with no angina or shortness of breath. Continue:  Plavix 75mg once daily  Coreg 25mg twice daily  Lisinopril 20mg twice daily  CHANGE Lipitor to Crestor 40mg and ADD Zetia 10mg once daily     2. Hypertension, treated with Coreg, Lisinopril, Amlodipien and PRN Hydralazine, stable. BP is good today.    3. Hyperlipidemia, treated with  Lipitor 80mg. Last LDL was 110 (goal is <70, closer to 50, given age/CAD).   Stop LIpitor  Start Crestor 40mg and Zetia 10mg once daily  Repeat CMP and lipid panel in 2 months  We discussed dietary changes, including trying to eat more plants based.     4. GERD, treated with OTC meds, stable.    As above, change Lipitor to Crestor/Zetia.  Repeat labs in 2 months.  I will notify him of results.  Try to make some dietary changes.  Otherwise, same medications.    Follow-up annually, sooner if clinical condition changes.

## 2025-04-01 DIAGNOSIS — I25.83 CORONARY ARTERY DISEASE DUE TO LIPID RICH PLAQUE: ICD-10-CM

## 2025-04-01 DIAGNOSIS — I25.10 CORONARY ARTERY DISEASE DUE TO LIPID RICH PLAQUE: ICD-10-CM

## 2025-04-01 DIAGNOSIS — E78.5 HYPERLIPIDEMIA, UNSPECIFIED HYPERLIPIDEMIA TYPE: ICD-10-CM

## 2025-04-01 RX ORDER — ATORVASTATIN CALCIUM 80 MG/1
80 TABLET, FILM COATED ORAL DAILY
Qty: 90 TABLET | OUTPATIENT
Start: 2025-04-01

## 2025-05-09 ENCOUNTER — PATIENT MESSAGE (OUTPATIENT)
Dept: CARDIOLOGY | Facility: PHYSICIAN GROUP | Age: 50
End: 2025-05-09
Payer: COMMERCIAL

## 2025-05-13 NOTE — PATIENT COMMUNICATION
AB: Please see mychart and advise if you have any concerns for Theraflu and Lisinopril interactions

## 2025-06-04 DIAGNOSIS — I10 ESSENTIAL HYPERTENSION, BENIGN: ICD-10-CM

## 2025-06-04 RX ORDER — HYDRALAZINE HYDROCHLORIDE 25 MG/1
TABLET, FILM COATED ORAL
Qty: 90 TABLET | Refills: 0 | Status: SHIPPED | OUTPATIENT
Start: 2025-06-04